# Patient Record
Sex: FEMALE | Race: BLACK OR AFRICAN AMERICAN | NOT HISPANIC OR LATINO | URBAN - METROPOLITAN AREA
[De-identification: names, ages, dates, MRNs, and addresses within clinical notes are randomized per-mention and may not be internally consistent; named-entity substitution may affect disease eponyms.]

---

## 2020-07-29 ENCOUNTER — INPATIENT (INPATIENT)
Facility: HOSPITAL | Age: 75
LOS: 1 days | Discharge: HOME | DRG: 683 | End: 2020-07-31
Attending: INTERNAL MEDICINE | Admitting: INTERNAL MEDICINE
Payer: SELF-PAY

## 2020-07-29 VITALS
OXYGEN SATURATION: 98 % | TEMPERATURE: 100 F | WEIGHT: 139.99 LBS | DIASTOLIC BLOOD PRESSURE: 93 MMHG | HEART RATE: 101 BPM | SYSTOLIC BLOOD PRESSURE: 165 MMHG | RESPIRATION RATE: 16 BRPM

## 2020-07-29 DIAGNOSIS — E87.0 HYPEROSMOLALITY AND HYPERNATREMIA: ICD-10-CM

## 2020-07-29 DIAGNOSIS — F32.9 MAJOR DEPRESSIVE DISORDER, SINGLE EPISODE, UNSPECIFIED: ICD-10-CM

## 2020-07-29 DIAGNOSIS — I10 ESSENTIAL (PRIMARY) HYPERTENSION: ICD-10-CM
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
## 2020-07-29 DIAGNOSIS — R79.89 OTHER SPECIFIED ABNORMAL FINDINGS OF BLOOD CHEMISTRY: ICD-10-CM

## 2020-07-29 DIAGNOSIS — Z00.00 ENCOUNTER FOR GENERAL ADULT MEDICAL EXAMINATION WITHOUT ABNORMAL FINDINGS: ICD-10-CM

## 2020-07-29 DIAGNOSIS — M62.82 RHABDOMYOLYSIS: ICD-10-CM

## 2020-07-29 LAB
ALBUMIN SERPL ELPH-MCNC: 3.9 G/DL — SIGNIFICANT CHANGE UP (ref 3.4–5)
ALP SERPL-CCNC: 82 U/L — SIGNIFICANT CHANGE UP (ref 40–120)
ALT FLD-CCNC: 27 U/L — SIGNIFICANT CHANGE UP (ref 12–42)
ANION GAP SERPL CALC-SCNC: 16 MMOL/L — SIGNIFICANT CHANGE UP (ref 5–17)
ANION GAP SERPL CALC-SCNC: 17 MMOL/L — HIGH (ref 9–16)
APPEARANCE UR: CLEAR — SIGNIFICANT CHANGE UP
AST SERPL-CCNC: 43 U/L — HIGH (ref 15–37)
BASOPHILS # BLD AUTO: 0.04 K/UL — SIGNIFICANT CHANGE UP (ref 0–0.2)
BASOPHILS NFR BLD AUTO: 0.4 % — SIGNIFICANT CHANGE UP (ref 0–2)
BILIRUB SERPL-MCNC: 0.6 MG/DL — SIGNIFICANT CHANGE UP (ref 0.2–1.2)
BILIRUB UR-MCNC: ABNORMAL
BUN SERPL-MCNC: 20 MG/DL — SIGNIFICANT CHANGE UP (ref 7–23)
BUN SERPL-MCNC: 31 MG/DL — HIGH (ref 7–23)
CALCIUM SERPL-MCNC: 10.1 MG/DL — SIGNIFICANT CHANGE UP (ref 8.5–10.5)
CALCIUM SERPL-MCNC: 9.1 MG/DL — SIGNIFICANT CHANGE UP (ref 8.4–10.5)
CHLORIDE SERPL-SCNC: 109 MMOL/L — HIGH (ref 96–108)
CHLORIDE SERPL-SCNC: 109 MMOL/L — HIGH (ref 96–108)
CK SERPL-CCNC: 471 U/L — HIGH (ref 25–170)
CK SERPL-CCNC: 594 U/L — HIGH (ref 26–192)
CO2 SERPL-SCNC: 21 MMOL/L — LOW (ref 22–31)
CO2 SERPL-SCNC: 22 MMOL/L — SIGNIFICANT CHANGE UP (ref 22–31)
COLOR SPEC: YELLOW — SIGNIFICANT CHANGE UP
CREAT SERPL-MCNC: 0.78 MG/DL — SIGNIFICANT CHANGE UP (ref 0.5–1.3)
CREAT SERPL-MCNC: 1.22 MG/DL — SIGNIFICANT CHANGE UP (ref 0.5–1.3)
DIFF PNL FLD: ABNORMAL
EOSINOPHIL # BLD AUTO: 0 K/UL — SIGNIFICANT CHANGE UP (ref 0–0.5)
EOSINOPHIL NFR BLD AUTO: 0 % — SIGNIFICANT CHANGE UP (ref 0–6)
GLUCOSE SERPL-MCNC: 84 MG/DL — SIGNIFICANT CHANGE UP (ref 70–99)
GLUCOSE SERPL-MCNC: 94 MG/DL — SIGNIFICANT CHANGE UP (ref 70–99)
GLUCOSE UR QL: NEGATIVE — SIGNIFICANT CHANGE UP
HCT VFR BLD CALC: 40.3 % — SIGNIFICANT CHANGE UP (ref 34.5–45)
HGB BLD-MCNC: 12.7 G/DL — SIGNIFICANT CHANGE UP (ref 11.5–15.5)
IMM GRANULOCYTES NFR BLD AUTO: 0.4 % — SIGNIFICANT CHANGE UP (ref 0–1.5)
KETONES UR-MCNC: 15 MG/DL
LACTATE SERPL-SCNC: 1.6 MMOL/L — SIGNIFICANT CHANGE UP (ref 0.4–2)
LEUKOCYTE ESTERASE UR-ACNC: ABNORMAL
LYMPHOCYTES # BLD AUTO: 1.07 K/UL — SIGNIFICANT CHANGE UP (ref 1–3.3)
LYMPHOCYTES # BLD AUTO: 10.6 % — LOW (ref 13–44)
MAGNESIUM SERPL-MCNC: 2.4 MG/DL — SIGNIFICANT CHANGE UP (ref 1.6–2.6)
MCHC RBC-ENTMCNC: 27.3 PG — SIGNIFICANT CHANGE UP (ref 27–34)
MCHC RBC-ENTMCNC: 31.5 GM/DL — LOW (ref 32–36)
MCV RBC AUTO: 86.5 FL — SIGNIFICANT CHANGE UP (ref 80–100)
MONOCYTES # BLD AUTO: 0.76 K/UL — SIGNIFICANT CHANGE UP (ref 0–0.9)
MONOCYTES NFR BLD AUTO: 7.6 % — SIGNIFICANT CHANGE UP (ref 2–14)
NEUTROPHILS # BLD AUTO: 8.15 K/UL — HIGH (ref 1.8–7.4)
NEUTROPHILS NFR BLD AUTO: 81 % — HIGH (ref 43–77)
NITRITE UR-MCNC: NEGATIVE — SIGNIFICANT CHANGE UP
NRBC # BLD: 0 /100 WBCS — SIGNIFICANT CHANGE UP (ref 0–0)
PH UR: 5.5 — SIGNIFICANT CHANGE UP (ref 5–8)
PLATELET # BLD AUTO: 201 K/UL — SIGNIFICANT CHANGE UP (ref 150–400)
POTASSIUM SERPL-MCNC: 3.5 MMOL/L — SIGNIFICANT CHANGE UP (ref 3.5–5.3)
POTASSIUM SERPL-MCNC: 3.5 MMOL/L — SIGNIFICANT CHANGE UP (ref 3.5–5.3)
POTASSIUM SERPL-SCNC: 3.5 MMOL/L — SIGNIFICANT CHANGE UP (ref 3.5–5.3)
POTASSIUM SERPL-SCNC: 3.5 MMOL/L — SIGNIFICANT CHANGE UP (ref 3.5–5.3)
PROT SERPL-MCNC: 8.2 G/DL — SIGNIFICANT CHANGE UP (ref 6.4–8.2)
PROT UR-MCNC: ABNORMAL MG/DL
RBC # BLD: 4.66 M/UL — SIGNIFICANT CHANGE UP (ref 3.8–5.2)
RBC # FLD: 13.3 % — SIGNIFICANT CHANGE UP (ref 10.3–14.5)
SODIUM SERPL-SCNC: 146 MMOL/L — HIGH (ref 135–145)
SODIUM SERPL-SCNC: 148 MMOL/L — HIGH (ref 132–145)
SP GR SPEC: >=1.03 — SIGNIFICANT CHANGE UP (ref 1–1.03)
TROPONIN I SERPL-MCNC: 0.23 NG/ML — HIGH (ref 0.02–0.06)
TROPONIN I SERPL-MCNC: 0.26 NG/ML — HIGH (ref 0.02–0.06)
TROPONIN T SERPL-MCNC: 0.02 NG/ML — HIGH (ref 0–0.01)
UROBILINOGEN FLD QL: 0.2 E.U./DL — SIGNIFICANT CHANGE UP
WBC # BLD: 10.06 K/UL — SIGNIFICANT CHANGE UP (ref 3.8–10.5)
WBC # FLD AUTO: 10.06 K/UL — SIGNIFICANT CHANGE UP (ref 3.8–10.5)

## 2020-07-29 PROCEDURE — 72192 CT PELVIS W/O DYE: CPT | Mod: 26

## 2020-07-29 PROCEDURE — 71045 X-RAY EXAM CHEST 1 VIEW: CPT | Mod: 26

## 2020-07-29 PROCEDURE — 93010 ELECTROCARDIOGRAM REPORT: CPT

## 2020-07-29 PROCEDURE — 99222 1ST HOSP IP/OBS MODERATE 55: CPT | Mod: AI,GC

## 2020-07-29 PROCEDURE — 70450 CT HEAD/BRAIN W/O DYE: CPT | Mod: 26

## 2020-07-29 PROCEDURE — 99285 EMERGENCY DEPT VISIT HI MDM: CPT

## 2020-07-29 RX ORDER — SENNA PLUS 8.6 MG/1
2 TABLET ORAL AT BEDTIME
Refills: 0 | Status: DISCONTINUED | OUTPATIENT
Start: 2020-07-29 | End: 2020-07-31

## 2020-07-29 RX ORDER — HYDRALAZINE HCL 50 MG
25 TABLET ORAL ONCE
Refills: 0 | Status: COMPLETED | OUTPATIENT
Start: 2020-07-29 | End: 2020-07-29

## 2020-07-29 RX ORDER — SODIUM CHLORIDE 9 MG/ML
1000 INJECTION INTRAMUSCULAR; INTRAVENOUS; SUBCUTANEOUS ONCE
Refills: 0 | Status: COMPLETED | OUTPATIENT
Start: 2020-07-29 | End: 2020-07-29

## 2020-07-29 RX ORDER — SODIUM CHLORIDE 9 MG/ML
1000 INJECTION, SOLUTION INTRAVENOUS
Refills: 0 | Status: DISCONTINUED | OUTPATIENT
Start: 2020-07-29 | End: 2020-07-30

## 2020-07-29 RX ORDER — ENOXAPARIN SODIUM 100 MG/ML
40 INJECTION SUBCUTANEOUS EVERY 24 HOURS
Refills: 0 | Status: DISCONTINUED | OUTPATIENT
Start: 2020-07-29 | End: 2020-07-31

## 2020-07-29 RX ORDER — ACETAMINOPHEN 500 MG
650 TABLET ORAL EVERY 6 HOURS
Refills: 0 | Status: DISCONTINUED | OUTPATIENT
Start: 2020-07-29 | End: 2020-07-31

## 2020-07-29 RX ORDER — PANTOPRAZOLE SODIUM 20 MG/1
40 TABLET, DELAYED RELEASE ORAL
Refills: 0 | Status: DISCONTINUED | OUTPATIENT
Start: 2020-07-29 | End: 2020-07-30

## 2020-07-29 RX ADMIN — Medication 25 MILLIGRAM(S): at 22:13

## 2020-07-29 RX ADMIN — SODIUM CHLORIDE 1000 MILLILITER(S): 9 INJECTION INTRAMUSCULAR; INTRAVENOUS; SUBCUTANEOUS at 15:31

## 2020-07-29 RX ADMIN — Medication 650 MILLIGRAM(S): at 22:35

## 2020-07-29 RX ADMIN — SODIUM CHLORIDE 1000 MILLILITER(S): 9 INJECTION INTRAMUSCULAR; INTRAVENOUS; SUBCUTANEOUS at 15:00

## 2020-07-29 RX ADMIN — Medication 650 MILLIGRAM(S): at 22:13

## 2020-07-29 RX ADMIN — SODIUM CHLORIDE 2000 MILLILITER(S): 9 INJECTION INTRAMUSCULAR; INTRAVENOUS; SUBCUTANEOUS at 14:44

## 2020-07-29 NOTE — ED ADULT NURSE REASSESSMENT NOTE - NS ED NURSE REASSESS COMMENT FT1
Pt lab results rvwd.  Elevated troponin and CK noted.  Pt in NSR, denies CP.  Fluids in progress.  Will continue to reassess and reevaluate.

## 2020-07-29 NOTE — H&P ADULT - PROBLEM SELECTOR PLAN 5
- BUN/Cr 31/1.22 likely 2/2 decreased PO intake  - will c/w fluids    # Anion Gap  - AG 17 likely ketosis based on UA  - will feed and give some D5 tonight
Patient/Caregiver provided printed discharge information.

## 2020-07-29 NOTE — ED PROVIDER NOTE - CLINICAL SUMMARY MEDICAL DECISION MAKING FREE TEXT BOX
Patient with HTN, depression, frequent falls, BIB EMS after slip and fall from urinal + urinary incontinence, with R hip pain. XR chest WNL. CT head and pelvis negative for acute injury. found to have elevated trop .2 with 's nl Cr, Na 146. to be admitted for IV hydration and continued monitoring

## 2020-07-29 NOTE — ED PROVIDER NOTE - ATTENDING CONTRIBUTION TO CARE
PMHx HTN, depression, ? bipolar, unsteady gait walks with cane presents with fall from bed x 20 hrs ago. states that she had gotten out of bed at 4:40p yesterday and was on a urinal when she slipped falling back on her bed.  tachy otherwirse normal  vitals tender pelvis unable to ambulate//labs ua creatitine trop admit for nstemi

## 2020-07-29 NOTE — ED ADULT NURSE NOTE - NSIMPLEMENTINTERV_GEN_ALL_ED
Implemented All Fall Risk Interventions:  [place] to call system. Call bell, personal items and telephone within reach. Instruct patient to call for assistance. Room bathroom lighting operational. Non-slip footwear when patient is off stretcher. Physically safe environment: no spills, clutter or unnecessary equipment. Stretcher in lowest position, wheels locked, appropriate side rails in place. Provide visual cue, wrist band, yellow gown, etc. Monitor gait and stability. Monitor for mental status changes and reorient to person, place, and time. Review medications for side effects contributing to fall risk. Reinforce activity limits and safety measures with patient and family.

## 2020-07-29 NOTE — H&P ADULT - PROBLEM SELECTOR PLAN 6
- takes meds at home, doesn't know what  - SBP in 170's on arrival  - will give one dose of hydral 25 to bridge until morning  - med rec in AM

## 2020-07-29 NOTE — H&P ADULT - HISTORY OF PRESENT ILLNESS
75yoF with a PMH of HTN and depression presents s/p fall at home. Pt states that she keeps hanna jars near her to bed to urinate at night so that she doesn't have to walk to the bathroom. While trying to use one of these hanna jars last night she fell off her bed and urinated on the floor. She denies LOC however this was an unwitnessed event. She denies head trauma. ROS negative for fever, chills, chest pain, SOB, n/v/c/d, abd pain, dysuria, urinary frequency, numbness, or tingling. She reports no problems with ADL's. Can't remember the names of her medications but says she uses [facility].     PMH: as above  meds: she can't remember, but takes BP meds. By her description takes a diuretic  PSH: none  Allergies: NKDA  SH: denies smoking or drinking. Lives alone on [address] [place] [address]    ED  VS: T 99.7, /93, , RR 16 sat 98% on room air  Labs: WBC 10, hgb 12.7, , Na 148, AG 17, BUN/Cr 31/1.22. Troponin I 0.26 --> 0.22 s/p 2L. UA dirty  Studies: EKG without acute ischemia  Imaging: CXR clear  Intervention: 2L NS

## 2020-07-29 NOTE — H&P ADULT - PROBLEM SELECTOR PLAN 2
- elevated troponin in setting of missed BP meds  - no chest pain or cardiac complaints  - EKG without acute ischemia  - unlikely a coronary event  - will trend troponin

## 2020-07-29 NOTE — ED PROVIDER NOTE - PROGRESS NOTE DETAILS
spoke with cardiology on call. Dr Anne. believes that trop elevation is due to rhabdomyelosis rather than NSTEMI and does not need to be admitted to a monitored setting.

## 2020-07-29 NOTE — ED PROVIDER NOTE - OBJECTIVE STATEMENT
PMHx HTN presents with fall from bed x 20 hrs ago. states that she had gotten out of bed at 4:40p yesturday and was urin PMHx HTN, depression, unsteady gait walks with cane presents with fall from bed x 20 hrs ago. states that she had gotten out of bed at 4:40p yesterday and was on a urinal when she slipped falling back on her bed. denies LOC, h/a, dizziness, nausea, vomiting, fever, chills, URI sx, recent travel. states she attempted to get up by sliding herself down to the floor but was unable to get off the floor. she was found by a neighbor who was doing a wellness check. + incontinent of urine. admits to R hip pain from another fall 2 weeks ago. states that she had injections to that joint.

## 2020-07-29 NOTE — H&P ADULT - PROBLEM SELECTOR PLAN 1
- pt slipped off her bed while trying to urinate in a hanna jar at home, based on history sounds like mechanical fall.   - unclear how much time she spent on floor because being found  - Pt denies LOC, vagal prodrome, or head trauma  - CT head and pelvis both negative  - EKG did not reveal any conduction disease  - will order echo to rule out structural disease  - PT consult in AM - pt slipped off her bed while trying to urinate in a hanna jar at home, based on history sounds like mechanical fall.   - unclear how much time she spent on floor because being found  - Pt denies LOC, vagal prodrome, or head trauma  - CT head and pelvis both negative  - EKG did not reveal any conduction disease  - PT consult in AM

## 2020-07-29 NOTE — H&P ADULT - PROBLEM SELECTOR PLAN 8
.  F: 1L D5 1/2NS  E: PRN  N: DASH  DVT: lovenox  GI: PPI  bowel: senna  glucose: none  pain: tylenol  dispo:  RMF .  F: 1L D5 1/2NS  E: PRN  N: DASH  DVT: lovenox  GI: none  bowel: senna  glucose: none  pain: tylenol  dispo: RMF

## 2020-07-29 NOTE — H&P ADULT - PROBLEM SELECTOR PLAN 4
- Na 148 on admission likely 2/2 decreased PO intake, down to 146 s/p 2L  - will c/w IVF and trend chemistry

## 2020-07-29 NOTE — ED ADULT NURSE NOTE - OBJECTIVE STATEMENT
Pt w/ PMH of traumatic fall w/ injury 20+ years ago presents today BIBA c/o mechanical fall last night at 17:00.  Pt was on the floor until she was found by a friend at home today around noon.  Pt is A&Ox3.  Pt denies LOC, head trauma, CP, numbness, paresthesias or headache.  Pt is pending imagining and lab results.  Will continue to reassess and reevaluate.

## 2020-07-29 NOTE — H&P ADULT - ATTENDING COMMENTS
75F PMHx HTN, Depression, and unsteady gait (ambulates with cane) – presenting s/p mechanical fall and period of immobility due to inability to get up. Admission labs illustrate dehydration and trop elevation in setting of HTN urgency.    At bedside, patient is very pleasant, quirky. Offering no complaints whatsoever and relays a history of a clear mechanical fall w/o c/f prodrome or syncopal episodes. Exam is within normal limits aside from mildly dry mucus membranes. A&O x 2. Demonstrating good strength in b/l UE and LE. Sensation intact.     Presentation consistent with dehydration (Hypernatremia, High SG on UA) and hypertensive urgency. Suspect mild APRIL with subsequent improvement with fluids is the result of pre-renal injury. Troponin leak likely related to demand in the setting of uncontrolled HTN - patient w/o c/o chest pain/exertional dyspnea and no acute ischemic changes on EKG. ED consulted Cardiology - suspected that troponin was related to rhabdomyolysis - low concern for this given minimally elevated CPK and UA demonstrated blood and RBCs - unlikely significant myoglobinuria.     - Med Rec required - patient can not recall her home medications. Believes she is on a diuretic, which may not be the best agent in an elderly female, but, electrolytes are within normal limits on admission. May benefit from Norvasc addition if home regimen is inadequate.  - PT

## 2020-07-29 NOTE — ED ADULT NURSE NOTE - CHPI ED NUR SYMPTOMS NEG
no bleeding/no deformity/no abrasion/no confusion/no vomiting/no numbness/no fever/no loss of consciousness/no tingling

## 2020-07-29 NOTE — ED ADULT TRIAGE NOTE - CHIEF COMPLAINT QUOTE
BIBA after neighbor called 911 due to patient sustaining mechanical fall 1.5 days ago. pt reports having right groin pain when getting (from old injury). as per EMS, pt was unable to ambulate or bear weight on scene. h/o HTN and depression. denies head injury/LOC.

## 2020-07-29 NOTE — H&P ADULT - NSHPLABSRESULTS_GEN_ALL_CORE
12.7   10.06 )-----------( 201      ( 29 Jul 2020 14:32 )             40.3       07-29    146<H>  |  109<H>  |  20  ----------------------------<  84  3.5   |  21<L>  |  0.78    Ca    9.1      29 Jul 2020 22:43  Mg     2.4     07-29    TPro  8.2  /  Alb  3.9  /  TBili  0.6  /  DBili  x   /  AST  43<H>  /  ALT  27  /  AlkPhos  82  07-29              Urinalysis Basic - ( 29 Jul 2020 16:03 )    Color: Yellow / Appearance: Clear / SG: >=1.030 / pH: x  Gluc: x / Ketone: 15 mg/dL  / Bili: Small / Urobili: 0.2 E.U./dL   Blood: x / Protein: Trace mg/dL / Nitrite: NEGATIVE   Leuk Esterase: Small / RBC: 5-10 /HPF / WBC 5-10 /HPF   Sq Epi: x / Non Sq Epi: Moderate /HPF / Bacteria: Present /HPF            Lactate Trend  07-29 @ 14:32 Lactate:1.6       CARDIAC MARKERS ( 29 Jul 2020 22:43 )  x     / 0.02 ng/mL / 471 U/L / x     / x      CARDIAC MARKERS ( 29 Jul 2020 18:34 )  0.227 ng/mL / x     / x     / x     / x      CARDIAC MARKERS ( 29 Jul 2020 14:32 )  0.263 ng/mL / x     / 594 U/L / x     / x            CAPILLARY BLOOD GLUCOSE

## 2020-07-30 LAB
ANION GAP SERPL CALC-SCNC: 14 MMOL/L — SIGNIFICANT CHANGE UP (ref 5–17)
APPEARANCE UR: CLEAR — SIGNIFICANT CHANGE UP
BACTERIA # UR AUTO: PRESENT /HPF
BILIRUB UR-MCNC: ABNORMAL
BUN SERPL-MCNC: 20 MG/DL — SIGNIFICANT CHANGE UP (ref 7–23)
CALCIUM SERPL-MCNC: 9 MG/DL — SIGNIFICANT CHANGE UP (ref 8.4–10.5)
CHLORIDE SERPL-SCNC: 107 MMOL/L — SIGNIFICANT CHANGE UP (ref 96–108)
CK SERPL-CCNC: 400 U/L — HIGH (ref 25–170)
CO2 SERPL-SCNC: 23 MMOL/L — SIGNIFICANT CHANGE UP (ref 22–31)
COLOR SPEC: YELLOW — SIGNIFICANT CHANGE UP
CREAT SERPL-MCNC: 0.76 MG/DL — SIGNIFICANT CHANGE UP (ref 0.5–1.3)
DIFF PNL FLD: ABNORMAL
EPI CELLS # UR: SIGNIFICANT CHANGE UP /HPF (ref 0–5)
GLUCOSE SERPL-MCNC: 91 MG/DL — SIGNIFICANT CHANGE UP (ref 70–99)
GLUCOSE UR QL: NEGATIVE — SIGNIFICANT CHANGE UP
HCT VFR BLD CALC: 35.6 % — SIGNIFICANT CHANGE UP (ref 34.5–45)
HGB BLD-MCNC: 10.8 G/DL — LOW (ref 11.5–15.5)
KETONES UR-MCNC: 40 MG/DL
LEUKOCYTE ESTERASE UR-ACNC: NEGATIVE — SIGNIFICANT CHANGE UP
MAGNESIUM SERPL-MCNC: 2 MG/DL — SIGNIFICANT CHANGE UP (ref 1.6–2.6)
MCHC RBC-ENTMCNC: 26.8 PG — LOW (ref 27–34)
MCHC RBC-ENTMCNC: 30.3 GM/DL — LOW (ref 32–36)
MCV RBC AUTO: 88.3 FL — SIGNIFICANT CHANGE UP (ref 80–100)
NITRITE UR-MCNC: NEGATIVE — SIGNIFICANT CHANGE UP
NRBC # BLD: 0 /100 WBCS — SIGNIFICANT CHANGE UP (ref 0–0)
PH UR: 6 — SIGNIFICANT CHANGE UP (ref 5–8)
PHOSPHATE SERPL-MCNC: 2.7 MG/DL — SIGNIFICANT CHANGE UP (ref 2.5–4.5)
PLATELET # BLD AUTO: 173 K/UL — SIGNIFICANT CHANGE UP (ref 150–400)
POTASSIUM SERPL-MCNC: 3.2 MMOL/L — LOW (ref 3.5–5.3)
POTASSIUM SERPL-SCNC: 3.2 MMOL/L — LOW (ref 3.5–5.3)
PROT UR-MCNC: ABNORMAL MG/DL
RBC # BLD: 4.03 M/UL — SIGNIFICANT CHANGE UP (ref 3.8–5.2)
RBC # FLD: 13.4 % — SIGNIFICANT CHANGE UP (ref 10.3–14.5)
RBC CASTS # UR COMP ASSIST: < 5 /HPF — SIGNIFICANT CHANGE UP
SARS-COV-2 RNA SPEC QL NAA+PROBE: SIGNIFICANT CHANGE UP
SODIUM SERPL-SCNC: 144 MMOL/L — SIGNIFICANT CHANGE UP (ref 135–145)
SP GR SPEC: >=1.03 — SIGNIFICANT CHANGE UP (ref 1–1.03)
TROPONIN T SERPL-MCNC: 0.02 NG/ML — HIGH (ref 0–0.01)
UROBILINOGEN FLD QL: 0.2 E.U./DL — SIGNIFICANT CHANGE UP
WBC # BLD: 8.04 K/UL — SIGNIFICANT CHANGE UP (ref 3.8–10.5)
WBC # FLD AUTO: 8.04 K/UL — SIGNIFICANT CHANGE UP (ref 3.8–10.5)
WBC UR QL: < 5 /HPF — SIGNIFICANT CHANGE UP

## 2020-07-30 PROCEDURE — 99233 SBSQ HOSP IP/OBS HIGH 50: CPT | Mod: GC

## 2020-07-30 RX ORDER — SERTRALINE 25 MG/1
1 TABLET, FILM COATED ORAL
Qty: 0 | Refills: 0 | DISCHARGE

## 2020-07-30 RX ORDER — GABAPENTIN 400 MG/1
300 CAPSULE ORAL AT BEDTIME
Refills: 0 | Status: DISCONTINUED | OUTPATIENT
Start: 2020-07-30 | End: 2020-07-31

## 2020-07-30 RX ORDER — GABAPENTIN 400 MG/1
1 CAPSULE ORAL
Qty: 0 | Refills: 0 | DISCHARGE

## 2020-07-30 RX ORDER — SERTRALINE 25 MG/1
50 TABLET, FILM COATED ORAL DAILY
Refills: 0 | Status: DISCONTINUED | OUTPATIENT
Start: 2020-07-30 | End: 2020-07-31

## 2020-07-30 RX ADMIN — Medication 650 MILLIGRAM(S): at 04:47

## 2020-07-30 RX ADMIN — SODIUM CHLORIDE 50 MILLILITER(S): 9 INJECTION, SOLUTION INTRAVENOUS at 00:26

## 2020-07-30 RX ADMIN — Medication 650 MILLIGRAM(S): at 07:35

## 2020-07-30 RX ADMIN — GABAPENTIN 300 MILLIGRAM(S): 400 CAPSULE ORAL at 21:02

## 2020-07-30 RX ADMIN — SERTRALINE 50 MILLIGRAM(S): 25 TABLET, FILM COATED ORAL at 13:01

## 2020-07-30 RX ADMIN — ENOXAPARIN SODIUM 40 MILLIGRAM(S): 100 INJECTION SUBCUTANEOUS at 04:47

## 2020-07-30 RX ADMIN — Medication 0.1 MILLIGRAM(S): at 21:02

## 2020-07-30 NOTE — DISCHARGE NOTE PROVIDER - NSDCFUADDAPPT_GEN_ALL_CORE_FT
[facility]  [address]  Phone: [phone]  Fax: (   )    -  Established Patient  Follow Up Time: 1 week

## 2020-07-30 NOTE — DISCHARGE NOTE PROVIDER - NSDCMRMEDTOKEN_GEN_ALL_CORE_FT
cloNIDine 0.1 mg oral tablet: 1 tab(s) orally 2 times a day  gabapentin 300 mg oral capsule: 1 cap(s) orally once a day (at bedtime)  sertraline 50 mg oral tablet: 1 tab(s) orally once a day

## 2020-07-30 NOTE — PROGRESS NOTE ADULT - PROBLEM SELECTOR PLAN 5
- BUN/Cr 31/1.22 likely 2/2 decreased PO intake  - will c/w fluids    # Anion Gap  - AG 17 likely ketosis based on UA  - will feed and give some D5 tonight - BUN/Cr 31/1.22 likely 2/2 decreased PO intake  - resolved to 20/0.76    # Anion Gap  - AG 17 likely ketosis based on UA  - will feed and give some D5 tonight

## 2020-07-30 NOTE — DISCHARGE NOTE PROVIDER - HOSPITAL COURSE
***INCOMPLETE*****        Patient is 74 yo F with a PMH of HTN and depression presents s/p fall at home. Admitted for mechanical fall evaluation, found to have APRIL and hypernatremia, stable for discharge home with PT.        Problem List/Main Diagnoses:         1. Mechanical Fall: Evaluated by PT after all other causes ruled out.        2. APRIL: resolved with IVF        3. Hypernatremia: resolved with IVF            Inpatient treatment course: IVF and PT evaluation         New medications: none         Labs to be followed outpatient: none        Exam to be followed outpatient: none ***INCOMPLETE*****    Patient is 74 yo F with a PMH of HTN and depression presents s/p fall at home. Admitted for mechanical fall evaluation, found to have APRIL and hypernatremia. Resolved, and patient stable for discharge to [facility], but refused.  Stable for discharge home with PT.        Problem List/Main Diagnoses:         1. Mechanical Fall: Evaluated by PT after all other causes ruled out. [facility] recommended but patient refused. Discharged home with         2. APRIL: resolved with IVF        3. Hypernatremia: resolved with IVF            Inpatient treatment course: IVF and PT evaluation         New medications: none         Labs to be followed outpatient: none        Exam to be followed outpatient: none ***INCOMPLETE*****    Patient is 76 yo F with a PMH of HTN and depression presents s/p fall at home. Admitted for mechanical fall evaluation, found to have APRIL and hypernatremia. Resolved, and patient stable for discharge to [facility], but refused.  Patient needs assistance, but stable for discharge home with in home PT.        Problem List/Main Diagnoses:         1. Mechanical Fall: Evaluated by PT after all other causes ruled out. [facility] recommended but patient refused. Discharged home with in home PT        2. APRIL: resolved with IVF        3. Hypernatremia: resolved with IVF            Inpatient treatment course: IVF and PT evaluation         New medications: none         Labs to be followed outpatient: none        Exam to be followed outpatient: none Patient is 76 yo F with a PMH of HTN and depression presents s/p fall at home. Admitted for mechanical fall evaluation, found to have APRIL and hypernatremia. Resolved, and patient stable for discharge to [facility], but refused.  Patient needs assistance, but stable for discharge home with in home PT.        Problem List/Main Diagnoses:         1. Mechanical Fall: Evaluated by PT after all other causes ruled out. [facility] recommended but patient refused. Discharged home with in home PT        2. APRIL: resolved with IVF        3. Hypernatremia: resolved with IVF            Inpatient treatment course: IVF and PT evaluation         New medications: none         Labs to be followed outpatient: none        Exam to be followed outpatient: none Patient is 76 yo F with a PMH of HTN and depression presents s/p fall at home. Admitted for mechanical fall evaluation, found to have APRIL and hypernatremia. Resolved, and patient stable for discharge home with PT.        Problem List/Main Diagnoses:         1. Mechanical Fall: Evaluated by PT after all other causes ruled out. Discharged home with in home PT        2. APRIL: resolved with IVF        3. Hypernatremia: resolved with IVF            Inpatient treatment course: IVF, home medications continued, and PT evaluation         New medications: none         Labs to be followed outpatient: none        Exam to be followed outpatient: none Patient is 74 yo F with a PMH of HTN and depression presents s/p fall at home. Admitted for mechanical fall evaluation, found to have APRIL and hypernatremia. Resolved, and patient stable for discharge home with PT.        Problem List/Main Diagnoses:         1. Mechanical Fall: Evaluated by PT after all other causes ruled out. Discharged home with in home PT        2. APRIL: resolved with IVF        3. Hypernatremia: resolved with IVF            Inpatient treatment course: IVF          New medications: none         Labs to be followed outpatient: none        Exam to be followed outpatient: none

## 2020-07-30 NOTE — PHYSICAL THERAPY INITIAL EVALUATION ADULT - CRITERIA FOR SKILLED THERAPEUTIC INTERVENTIONS
functional limitations in following categories/anticipated discharge recommendation/risk reduction/prevention/therapy frequency/impairments found/rehab potential

## 2020-07-30 NOTE — PROGRESS NOTE ADULT - PROBLEM SELECTOR PLAN 6
- takes meds at home, doesn't know what  - SBP in 170's on arrival  - will give one dose of hydral 25 to bridge until morning  - med rec in AM - restarted home clonidine 0.1 mg  - SBP in 170's on arrival, given one dose hydral 25

## 2020-07-30 NOTE — DISCHARGE NOTE PROVIDER - NSDCCPCAREPLAN_GEN_ALL_CORE_FT
PRINCIPAL DISCHARGE DIAGNOSIS  Diagnosis: Fall  Assessment and Plan of Treatment: You were admitted to the hospital for evaluation after falling at home      SECONDARY DISCHARGE DIAGNOSES  Diagnosis: HTN (hypertension)  Assessment and Plan of Treatment: HTN (hypertension)    Diagnosis: Depression  Assessment and Plan of Treatment: Depression PRINCIPAL DISCHARGE DIAGNOSIS  Diagnosis: Fall  Assessment and Plan of Treatment: You were admitted to the hospital for evaluation after falling at home. You were evaluated by physical therapists in the hospital and they determined that you would benefit from continued Physical Therapy      SECONDARY DISCHARGE DIAGNOSES  Diagnosis: HTN (hypertension)  Assessment and Plan of Treatment: HTN (hypertension)    Diagnosis: Depression  Assessment and Plan of Treatment: Depression PRINCIPAL DISCHARGE DIAGNOSIS  Diagnosis: Fall  Assessment and Plan of Treatment: You were admitted to the hospital for monitoring after falling at home. During your hospitalization you were evaluated by physical therapists in the hospital and they determined that you would benefit from continued Physical Therapy in the setting of Sub-Acute Rehab. You refused this option and therfore, Physical Therapy in your home was recommended. There is increased risk of additional falls when you are unsupervised at home. Please contact EMS immediately if you fall at home.  Continue to work with Physical Therapy in your home to regain strength.      SECONDARY DISCHARGE DIAGNOSES  Diagnosis: HTN (hypertension)  Assessment and Plan of Treatment: During your hospitalization you were given a fast acting blood pressure medication in the ED to correct your high blood pressure, then your home blood pressure medication was restarted and your blood pressures normalized.  Please continue to take your blood pressure medication as prescribed.  If you feel lightheaded, have a pounding headache, feel dizzy or nauseated, or have an episode of blurry vision please go to the nearest ED for treatment.    Diagnosis: Depression  Assessment and Plan of Treatment: During your hospitalization you were continued on your home medications for depression.  Please continue to follow up with your primary care physician or therapist to manage your depression.  If you have feelings of hopelessness, intrusive negative thoughts, or thoughts about suicide, please go to the nearest ED for treatment. PRINCIPAL DISCHARGE DIAGNOSIS  Diagnosis: Fall  Assessment and Plan of Treatment: You were admitted to the hospital for monitoring after falling at home. During your hospitalization you were evaluated by physical therapists in the hospital and they determined that you would benefit from continued Physical Therapy. There is increased risk of additional falls when you are unsupervised at home. Please contact EMS immediately if you fall at home.  Continue to work with Physical Therapy in your home to regain strength.      SECONDARY DISCHARGE DIAGNOSES  Diagnosis: HTN (hypertension)  Assessment and Plan of Treatment: During your hospitalization you were given a fast acting blood pressure medication in the ED to correct your high blood pressure, then your home blood pressure medication was restarted and your blood pressures normalized.  Please continue to take your blood pressure medication as prescribed.  If you feel lightheaded, have a pounding headache, feel dizzy or nauseated, or have an episode of blurry vision please go to the nearest ED for treatment.    Diagnosis: Depression  Assessment and Plan of Treatment: During your hospitalization you were continued on your home medications for depression.  Please continue to follow up with your primary care physician or therapist to manage your depression.  If you have feelings of hopelessness, intrusive negative thoughts, or thoughts about suicide, please go to the nearest ED for treatment.

## 2020-07-30 NOTE — PROGRESS NOTE ADULT - SUBJECTIVE AND OBJECTIVE BOX
***INCOMPLETE***  INTERVAL HPI/OVERNIGHT EVENTS:      On further ROS, patient did not have complaint of: Headache, Blurred Vision, Cough, Dyspnea, Palpitations, Abdominal Pain, N/V, Weakness, Change in Sensation.     VITAL SIGNS:  T(F): 98.5 (07-30-20 @ 04:58)  HR: 71 (07-30-20 @ 04:58)  BP: 159/78 (07-30-20 @ 04:58)  RR: 20 (07-30-20 @ 04:58)  SpO2: 97% (07-30-20 @ 04:58)  Wt(kg): --    Input & Output:    07-29-20 @ 07:01  -  07-30-20 @ 07:00  --------------------------------------------------------  IN: 0 mL / OUT: 400 mL / NET: -400 mL        PHYSICAL EXAM:  Constitutional: Patient seated comfortably in bed, of appropriate color, nutrition, and hydration.   HEENT: PERRLA, Normal Range of eye movement with no complaint of diplopia, Normal Hearing  Neck: No LAD, No JVD  Back: Normal spine flexure, No CVA tenderness  Respiratory: Normal air entry, Lungs clear to auscultation b/l w/o wheeze or crepitations.   Cardiovascular: S1 and S2 present - no additional abnormal sounds or murmurs. Normal rhythm and rate of pulse.   Gastrointestinal: BS+, soft, NT/ND  Extremities: No peripheral edema  Vascular: 2+ peripheral pulses  Neurological: A/O x 3, CN V-XII grossly intact.  Psychiatric: Normal mood, normal affect  Musculoskeletal: 5/5 strength b/l upper and lower extremities  Skin: No rashes    MEDICATIONS  (STANDING):  enoxaparin Injectable 40 milliGRAM(s) SubCutaneous every 24 hours  gabapentin 300 milliGRAM(s) Oral at bedtime  senna 2 Tablet(s) Oral at bedtime  sertraline 50 milliGRAM(s) Oral daily    MEDICATIONS  (PRN):  acetaminophen   Tablet .. 650 milliGRAM(s) Oral every 6 hours PRN Temp greater or equal to 38C (100.4F), Moderate Pain (4 - 6)      Allergies    No Known Allergies    Intolerances        LABS:                        10.8   8.04  )-----------( 173      ( 30 Jul 2020 07:56 )             35.6     07-30    144  |  107  |  20  ----------------------------<  91  3.2<L>   |  23  |  0.76    Ca    9.0      30 Jul 2020 07:54  Phos  2.7     07-30  Mg     2.0     07-30    TPro  8.2  /  Alb  3.9  /  TBili  0.6  /  DBili  x   /  AST  43<H>  /  ALT  27  /  AlkPhos  82  07-29      Urinalysis Basic - ( 30 Jul 2020 03:44 )    Color: Yellow / Appearance: Clear / SG: >=1.030 / pH: x  Gluc: x / Ketone: 40 mg/dL  / Bili: Small / Urobili: 0.2 E.U./dL   Blood: x / Protein: Trace mg/dL / Nitrite: NEGATIVE   Leuk Esterase: NEGATIVE / RBC: < 5 /HPF / WBC < 5 /HPF   Sq Epi: x / Non Sq Epi: 0-5 /HPF / Bacteria: Present /HPF        RADIOLOGY & ADDITIONAL TESTS: ***INCOMPLETE***  75yoF with a PMH of HTN and depression presents s/p fall at home. Pt states that she lost her balance and fell while attempting to urinate bedside per routine, and could not pick herself back up due to hip pain and decreased leg strength. Normally ambulates with cane at home. Denies LOC, head trauma, prodrome, aura, incontinence, visual disturbances, or tongue biting, no history of seizures or stroke. Could not remember name of home medications. In ED /93, , Na 148, AG 17, BUN/Cr 31/1.22. Troponin I 0.26 --> 0.22, EKG without acute ischemia, CXR clear. Given 2L NS  Admitted for evaluation of fall.     INTERVAL HPI/OVERNIGHT EVENTS:      On further ROS, patient did not have complaint of: Headache, Blurred Vision, Cough, Dyspnea, Palpitations, Abdominal Pain, N/V, Weakness, Change in Sensation.     VITAL SIGNS:  T(F): 98.5 (07-30-20 @ 04:58)  HR: 71 (07-30-20 @ 04:58)  BP: 159/78 (07-30-20 @ 04:58)  RR: 20 (07-30-20 @ 04:58)  SpO2: 97% (07-30-20 @ 04:58)  Wt(kg): --    Input & Output:    07-29-20 @ 07:01  -  07-30-20 @ 07:00  --------------------------------------------------------  IN: 0 mL / OUT: 400 mL / NET: -400 mL        PHYSICAL EXAM:  Constitutional: Patient seated comfortably in bed, of appropriate color, nutrition, and hydration.   HEENT: PERRLA, Normal Range of eye movement with no complaint of diplopia, Normal Hearing  Neck: No LAD, No JVD  Back: Normal spine flexure, No CVA tenderness  Respiratory: Normal air entry, Lungs clear to auscultation b/l w/o wheeze or crepitations.   Cardiovascular: S1 and S2 present - no additional abnormal sounds or murmurs. Normal rhythm and rate of pulse.   Gastrointestinal: BS+, soft, NT/ND  Extremities: No peripheral edema  Vascular: 2+ peripheral pulses  Neurological: A/O x 3, CN V-XII grossly intact.  Psychiatric: Normal mood, normal affect  Musculoskeletal: 5/5 strength b/l upper and lower extremities  Skin: No rashes    MEDICATIONS  (STANDING):  enoxaparin Injectable 40 milliGRAM(s) SubCutaneous every 24 hours  gabapentin 300 milliGRAM(s) Oral at bedtime  senna 2 Tablet(s) Oral at bedtime  sertraline 50 milliGRAM(s) Oral daily    MEDICATIONS  (PRN):  acetaminophen   Tablet .. 650 milliGRAM(s) Oral every 6 hours PRN Temp greater or equal to 38C (100.4F), Moderate Pain (4 - 6)      Allergies    No Known Allergies    Intolerances        LABS:                        10.8   8.04  )-----------( 173      ( 30 Jul 2020 07:56 )             35.6     07-30    144  |  107  |  20  ----------------------------<  91  3.2<L>   |  23  |  0.76    Ca    9.0      30 Jul 2020 07:54  Phos  2.7     07-30  Mg     2.0     07-30    TPro  8.2  /  Alb  3.9  /  TBili  0.6  /  DBili  x   /  AST  43<H>  /  ALT  27  /  AlkPhos  82  07-29      Urinalysis Basic - ( 30 Jul 2020 03:44 )    Color: Yellow / Appearance: Clear / SG: >=1.030 / pH: x  Gluc: x / Ketone: 40 mg/dL  / Bili: Small / Urobili: 0.2 E.U./dL   Blood: x / Protein: Trace mg/dL / Nitrite: NEGATIVE   Leuk Esterase: NEGATIVE / RBC: < 5 /HPF / WBC < 5 /HPF   Sq Epi: x / Non Sq Epi: 0-5 /HPF / Bacteria: Present /HPF        RADIOLOGY & ADDITIONAL TESTS: 75yoF with a PMH of HTN and depression presents s/p fall at home. Pt states that she lost her balance and fell while attempting to urinate bedside per routine, and could not pick herself back up due to hip pain and decreased leg strength. Normally ambulates with cane at home. Denies LOC, head trauma, prodrome, aura, incontinence, visual disturbances, or tongue biting, no history of seizures or stroke. Could not remember name of home medications. In ED /93, , Na 148, AG 17, BUN/Cr 31/1.22. Troponin I 0.26 --> 0.22, EKG without acute ischemia, CXR clear. Given 2L NS  Admitted for evaluation of fall.     INTERVAL HPI/OVERNIGHT EVENTS:  Patient sitting comfortably in bed with complaints of pain in her R groin and superficial skin abrasions. No other complaints this AM.    On further ROS, patient did not have complaint of: Headache, Blurred Vision, Cough, Dyspnea, Palpitations, Abdominal Pain, N/V, Weakness, Change in Sensation.     VITAL SIGNS:  T(F): 98.5 (07-30-20 @ 04:58)  HR: 71 (07-30-20 @ 04:58)  BP: 159/78 (07-30-20 @ 04:58)  RR: 20 (07-30-20 @ 04:58)  SpO2: 97% (07-30-20 @ 04:58)  Wt(kg): --    Input & Output:    07-29-20 @ 07:01  -  07-30-20 @ 07:00  --------------------------------------------------------  IN: 0 mL / OUT: 400 mL / NET: -400 mL        PHYSICAL EXAM:  Constitutional: Patient seated in bed, appears slightly dehydrated still.   HEENT: PERRLA, Normal Range of eye movement with no complaint of diplopia  Neck: No JVD  Back: No CVA tenderness  Respiratory: Normal air entry, Lungs CTA b/l w/o wheeze or crepitations.   Cardiovascular: RRR   Gastrointestinal: BS normoactive all 4 quadrants, soft, NT/ND  Extremities: No peripheral edema  Vascular: 2+ radial and dp b/l pulses  Neurological: A/O x 3, CN V-XII grossly intact.  Psychiatric: Normal mood, normal affect  Musculoskeletal: 5/5 strength b/l upper and 3/5 LLE, unable to assess RLE due to pain in patient hip  Skin: Some irritation without visible rash on patient's lower back and elbows.    MEDICATIONS  (STANDING):  enoxaparin Injectable 40 milliGRAM(s) SubCutaneous every 24 hours  gabapentin 300 milliGRAM(s) Oral at bedtime  senna 2 Tablet(s) Oral at bedtime  sertraline 50 milliGRAM(s) Oral daily    MEDICATIONS  (PRN):  acetaminophen   Tablet .. 650 milliGRAM(s) Oral every 6 hours PRN Temp greater or equal to 38C (100.4F), Moderate Pain (4 - 6)      Allergies    No Known Allergies    Intolerances        LABS:                        10.8   8.04  )-----------( 173      ( 30 Jul 2020 07:56 )             35.6     07-30    144  |  107  |  20  ----------------------------<  91  3.2<L>   |  23  |  0.76    Ca    9.0      30 Jul 2020 07:54  Phos  2.7     07-30  Mg     2.0     07-30    TPro  8.2  /  Alb  3.9  /  TBili  0.6  /  DBili  x   /  AST  43<H>  /  ALT  27  /  AlkPhos  82  07-29      Urinalysis Basic - ( 30 Jul 2020 03:44 )    Color: Yellow / Appearance: Clear / SG: >=1.030 / pH: x  Gluc: x / Ketone: 40 mg/dL  / Bili: Small / Urobili: 0.2 E.U./dL   Blood: x / Protein: Trace mg/dL / Nitrite: NEGATIVE   Leuk Esterase: NEGATIVE / RBC: < 5 /HPF / WBC < 5 /HPF   Sq Epi: x / Non Sq Epi: 0-5 /HPF / Bacteria: Present /HPF        RADIOLOGY & ADDITIONAL TESTS:  < from: Xray Chest 1 View AP/PA (07.29.20 @ 14:22) >  IMPRESSION:  The lungs are clear.    < from: CT Pelvis No Cont (07.29.20 @ 14:16) >  Impression:  No acute pelvic or femoral fracture. Severe osteoarthrosis of the righthip. Right hip joint effusion. Fracture of the sacrococcygeal junction of indeterminate age.    < from: CT Head No Cont (07.29.20 @ 14:16) >  IMPRESSION: No intracranial hemorrhage or calvarial fracture. 75yoF with a PMH of HTN and depression presents s/p fall at home. Pt states that she lost her balance and fell while attempting to urinate bedside per routine, and could not pick herself back up due to hip pain and decreased leg strength. Normally ambulates with cane at home. Denies LOC, head trauma, prodrome, aura, incontinence, visual disturbances, or tongue biting, no history of seizures or stroke. Could not remember name of home medications. In ED /93, , Na 148, AG 17, BUN/Cr 31/1.22. Troponin I 0.26 --> 0.22, EKG without acute ischemia, CXR clear. Given 2L NS  Admitted for evaluation of fall.     INTERVAL HPI/OVERNIGHT EVENTS:  Patient sitting comfortably in bed with complaints of pain in her R groin and superficial skin abrasions. No other complaints this AM. Discharge pending PT evaluation    On further ROS, patient did not have complaint of: Headache, Blurred Vision, Cough, Dyspnea, Palpitations, Abdominal Pain, N/V, Weakness, Change in Sensation.     VITAL SIGNS:  T(F): 98.5 (07-30-20 @ 04:58)  HR: 71 (07-30-20 @ 04:58)  BP: 159/78 (07-30-20 @ 04:58)  RR: 20 (07-30-20 @ 04:58)  SpO2: 97% (07-30-20 @ 04:58)  Wt(kg): --    Input & Output:    07-29-20 @ 07:01  -  07-30-20 @ 07:00  --------------------------------------------------------  IN: 0 mL / OUT: 400 mL / NET: -400 mL        PHYSICAL EXAM:  Constitutional: Patient seated in bed, appears slightly dehydrated still.   HEENT: PERRLA, Normal Range of eye movement with no complaint of diplopia  Neck: No JVD  Back: No CVA tenderness  Respiratory: Normal air entry, Lungs CTA b/l w/o wheeze or crepitations.   Cardiovascular: RRR   Gastrointestinal: BS normoactive all 4 quadrants, soft, NT/ND  Extremities: No peripheral edema  Vascular: 2+ radial and dp b/l pulses  Neurological: A/O x 3, CN V-XII grossly intact.  Psychiatric: Normal mood, normal affect  Musculoskeletal: 5/5 strength b/l upper and 3/5 LLE, unable to assess RLE due to pain in patient hip  Skin: Some irritation without visible rash on patient's lower back and elbows.    MEDICATIONS  (STANDING):  enoxaparin Injectable 40 milliGRAM(s) SubCutaneous every 24 hours  gabapentin 300 milliGRAM(s) Oral at bedtime  senna 2 Tablet(s) Oral at bedtime  sertraline 50 milliGRAM(s) Oral daily    MEDICATIONS  (PRN):  acetaminophen   Tablet .. 650 milliGRAM(s) Oral every 6 hours PRN Temp greater or equal to 38C (100.4F), Moderate Pain (4 - 6)      Allergies    No Known Allergies    Intolerances        LABS:                        10.8   8.04  )-----------( 173      ( 30 Jul 2020 07:56 )             35.6     07-30    144  |  107  |  20  ----------------------------<  91  3.2<L>   |  23  |  0.76    Ca    9.0      30 Jul 2020 07:54  Phos  2.7     07-30  Mg     2.0     07-30    TPro  8.2  /  Alb  3.9  /  TBili  0.6  /  DBili  x   /  AST  43<H>  /  ALT  27  /  AlkPhos  82  07-29      Urinalysis Basic - ( 30 Jul 2020 03:44 )    Color: Yellow / Appearance: Clear / SG: >=1.030 / pH: x  Gluc: x / Ketone: 40 mg/dL  / Bili: Small / Urobili: 0.2 E.U./dL   Blood: x / Protein: Trace mg/dL / Nitrite: NEGATIVE   Leuk Esterase: NEGATIVE / RBC: < 5 /HPF / WBC < 5 /HPF   Sq Epi: x / Non Sq Epi: 0-5 /HPF / Bacteria: Present /HPF        RADIOLOGY & ADDITIONAL TESTS:  < from: Xray Chest 1 View AP/PA (07.29.20 @ 14:22) >  IMPRESSION:  The lungs are clear.    < from: CT Pelvis No Cont (07.29.20 @ 14:16) >  Impression:  No acute pelvic or femoral fracture. Severe osteoarthrosis of the righthip. Right hip joint effusion. Fracture of the sacrococcygeal junction of indeterminate age.    < from: CT Head No Cont (07.29.20 @ 14:16) >  IMPRESSION: No intracranial hemorrhage or calvarial fracture.

## 2020-07-30 NOTE — PHYSICAL THERAPY INITIAL EVALUATION ADULT - GAIT DEVIATIONS NOTED, PT EVAL
poor foot clearance/increased time in double stance/decreased step length/decreased weight-shifting ability

## 2020-07-30 NOTE — PROGRESS NOTE ADULT - PROBLEM SELECTOR PLAN 1
- pt slipped off her bed while trying to urinate in a hanna jar at home, based on history sounds like mechanical fall.   - unclear how much time she spent on floor because being found  - Pt denies LOC, vagal prodrome, or head trauma  - CT head and pelvis both negative  - EKG did not reveal any conduction disease  - PT consult in AM - pt had mechanical fall yesterday attempting to urinate.   - On floor for >20 hours before found  - Pt denies LOC, vagal prodrome, aura, incontinence, or head trauma  - CT head and pelvis both negative  - EKG did not reveal any conduction disease  - PT recommends SUDHEER, but patient refusing, need for PT to evaluate possibility of in home pt

## 2020-07-30 NOTE — PROGRESS NOTE ADULT - PROBLEM SELECTOR PLAN 3
-  s/p being found down s/p fall  - s/p 2L NS in ED  - will c/w IVF and trend CK CK technically does not met criteria for rhabdo, but followed  - ->400  - s/p 2L NS in ED

## 2020-07-30 NOTE — CONSULT NOTE ADULT - ASSESSMENT
per Internal Medicine    75yoF with a PMH of HTN and depression presents s/p fall at home.    Problem/Plan - 1:  ·  Problem: Fall.  Plan: - pt slipped off her bed while trying to urinate in a hanna jar at home, based on history sounds like mechanical fall.   - unclear how much time she spent on floor because being found  - Pt denies LOC, vagal prodrome, or head trauma  - CT head and pelvis both negative  - EKG did not reveal any conduction disease  - PT consult in AM.     Problem/Plan - 2:  ·  Problem: Elevated troponin.  Plan: - elevated troponin in setting of missed BP meds  - no chest pain or cardiac complaints  - EKG without acute ischemia  - unlikely a coronary event  - will trend troponin.     Problem/Plan - 3:  ·  Problem: Rhabdomyolysis.  Plan: -  s/p being found down s/p fall  - s/p 2L NS in ED  - will c/w IVF and trend CK.     Problem/Plan - 4:  ·  Problem: Hypernatremia.  Plan: - Na 148 on admission likely 2/2 decreased PO intake, down to 146 s/p 2L  - will c/w IVF and trend chemistry.     Problem/Plan - 5:  ·  Problem: Azotemia.  Plan: - BUN/Cr 31/1.22 likely 2/2 decreased PO intake  - will c/w fluids    # Anion Gap  - AG 17 likely ketosis based on UA  - will feed and give some D5 tonight.     Problem/Plan - 6:  Problem: HTN (hypertension). Plan: - takes meds at home, doesn't know what  - SBP in 170's on arrival  - will give one dose of hydral 25 to bridge until morning  - med rec in AM.    Problem/Plan - 7:  ·  Problem: Depression.  Plan: - h/o depression used to take zoloft  - no SI or HI right now  - med rec in AM.     Problem/Plan - 8:  ·  Problem: Preventative health care.  Plan: .  F: 1L D5 1/2NS  E: PRN  N: DASH  DVT: lovenox  GI: none  bowel: senna  glucose: none  pain: tylenol  dispo: RMF.

## 2020-07-30 NOTE — PHYSICAL THERAPY INITIAL EVALUATION ADULT - ADDITIONAL COMMENTS
Pt reports living alone in an elevator access apartment, denies stairs. Pt reports being independent with all ADL's and functional mobility using a SC.

## 2020-07-30 NOTE — CONSULT NOTE ADULT - SUBJECTIVE AND OBJECTIVE BOX
Patient is a 75y old  Female who presents with a chief complaint of Fall (30 Jul 2020 11:34)       HPI:  75yoF with a PMH of HTN and depression presents s/p fall at home. Pt states that she keeps hanna jars near her to bed to urinate at night so that she doesn't have to walk to the bathroom. While trying to use one of these hanna jars last night she fell off her bed and urinated on the floor. She denies LOC however this was an unwitnessed event. She denies head trauma. ROS negative for fever, chills, chest pain, SOB, n/v/c/d, abd pain, dysuria, urinary frequency, numbness, or tingling. She reports no problems with ADL's. Can't remember the names of her medications but says she uses Duane Reade.     PMH: as above  meds: she can't remember, but takes BP meds. By her description takes a diuretic  PSH: none  Allergies: NKDA  SH: denies smoking or drinking. Lives alone on [address] [place] [address]    ED  VS: T 99.7, /93, , RR 16 sat 98% on room air  Labs: WBC 10, hgb 12.7, , Na 148, AG 17, BUN/Cr 31/1.22. Troponin I 0.26 --> 0.22 s/p 2L. UA dirty  Studies: EKG without acute ischemia  Imaging: CXR clear  Intervention: 2L NS (29 Jul 2020 20:54)      PAST MEDICAL & SURGICAL HISTORY:  No pertinent past medical history      MEDICATIONS  (STANDING):  enoxaparin Injectable 40 milliGRAM(s) SubCutaneous every 24 hours  gabapentin 300 milliGRAM(s) Oral at bedtime  senna 2 Tablet(s) Oral at bedtime  sertraline 50 milliGRAM(s) Oral daily    MEDICATIONS  (PRN):  acetaminophen   Tablet .. 650 milliGRAM(s) Oral every 6 hours PRN Temp greater or equal to 38C (100.4F), Moderate Pain (4 - 6)      Social History:           -  Home Living Status: lives alone in an elevator accessible apartment building           -  Prior Home Care Services:  none    Baseline Functional Level Prior to Admission:           - ADL's:    independent           - ambulatory status PTA:  walked with a cane    FAMILY HISTORY:      CBC Full  -  ( 30 Jul 2020 07:56 )  WBC Count : 8.04 K/uL  RBC Count : 4.03 M/uL  Hemoglobin : 10.8 g/dL  Hematocrit : 35.6 %  Platelet Count - Automated : 173 K/uL  Mean Cell Volume : 88.3 fl  Mean Cell Hemoglobin : 26.8 pg  Mean Cell Hemoglobin Concentration : 30.3 gm/dL  Auto Neutrophil # : x  Auto Lymphocyte # : x  Auto Monocyte # : x  Auto Eosinophil # : x  Auto Basophil # : x  Auto Neutrophil % : x  Auto Lymphocyte % : x  Auto Monocyte % : x  Auto Eosinophil % : x  Auto Basophil % : x      07-30    144  |  107  |  20  ----------------------------<  91  3.2<L>   |  23  |  0.76    Ca    9.0      30 Jul 2020 07:54  Phos  2.7     07-30  Mg     2.0     07-30    TPro  8.2  /  Alb  3.9  /  TBili  0.6  /  DBili  x   /  AST  43<H>  /  ALT  27  /  AlkPhos  82  07-29      Urinalysis Basic - ( 30 Jul 2020 03:44 )    Color: Yellow / Appearance: Clear / SG: >=1.030 / pH: x  Gluc: x / Ketone: 40 mg/dL  / Bili: Small / Urobili: 0.2 E.U./dL   Blood: x / Protein: Trace mg/dL / Nitrite: NEGATIVE   Leuk Esterase: NEGATIVE / RBC: < 5 /HPF / WBC < 5 /HPF   Sq Epi: x / Non Sq Epi: 0-5 /HPF / Bacteria: Present /HPF          Radiology:    < from: Xray Chest 1 View AP/PA (07.29.20 @ 14:22) >  EXAM:  XR CHEST AP OR PA 1V                           PROCEDURE DATE:  07/29/2020          INTERPRETATION:  PORTABLE CHEST X-RAY    HISTORY: admisison    PRIOR STUDIES: No prior studies are available for comparison.    FINDINGS: The lungs are clear.  There are no pleural effusions.  The cardiomediastinal silhouette is unremarkable. Appears to be soft tissue calcification involving the bilateral chest wall. Bony chest cage appears to be intact.    IMPRESSION:  The lungs are clear.        < from: CT Head No Cont (07.29.20 @ 14:16) >  EXAM:  CT BRAIN                           PROCEDURE DATE:  07/29/2020          INTERPRETATION:  PROCEDURE: CT head without intravenous contrast    INDICATIONS: Fall, headache.    TECHNIQUE:  Serial axial images were obtained from the skull base to the vertex without the use of intravenous contrast. Coronal and sagittal reconstructions were created.    COMPARISON EXAMINATION: None.    FINDINGS:  VENTRICLES AND SULCI: Enlargement compatible with volume loss. No hydrocephalus.  INTRA-AXIAL No acute hemorrhage, midline shift, or acute transcortical infarction present. Confluent patchy hypodensity within the periventricular white matter which are likely due to chronic small vessel ischemic disease.  EXTRA-AXIAL: No extra-axial fluid collection is present.  VISUALIZED SINUSES: No air-fluid levels are identified.  VISUALIZED MASTOIDS: Well aerated.  CALVARIUM: No acute calvarial fracture  MISCELLANEOUS:  None.    IMPRESSION: No intracranial hemorrhage or calvarial fracture.        < from: CT Pelvis No Cont (07.29.20 @ 14:16) >  EXAM:  CT PELVIS ONLY                           PROCEDURE DATE:  07/29/2020          INTERPRETATION:  CT PELVIS dated 7/29/2020 2:16 PM.    TECHNIQUE: Axial computed tomography images of the pelvis without intravenous contrast were obtained. Coronal and sagittal reformatted images were created and reviewed.    INDICATION: Right hip pain status post fall today    COMPARISON: None    FINDINGS: There is no acute pelvic or femoral fracture or dislocation. The  sacroiliac joints are intact.  There are severe degenerative changes of the right hip which appear chronic. There is bone-on-bone joint space narrowing with deformation of the right femoral head (without collapse) and osteophytosis. The left joint space is preserved. Osteopenia. Old fracture right superior pubic ramus. Right hip joint effusion. Fracture of the sacrococcygeal junction of indeterminate age.    There are degenerative changes of the lower lumbar spine with grade 1 anterolisthesis of L5 on S1. There is lumbarization of theS1 vertebral body.    Heavy calcified plaque aorta and branch vessels. Colonic diverticulosis. Uterus unremarkable. No adnexal mass. Urinary bladder unremarkable.    Impression:    No acute pelvic or femoral fracture. Severe osteoarthrosis of the right hip. Right hip joint effusion. Fracture of the sacrococcygeal junction of indeterminate age.                Vital Signs Last 24 Hrs  T(C): 36.9 (30 Jul 2020 04:58), Max: 37.2 (29 Jul 2020 19:54)  T(F): 98.5 (30 Jul 2020 04:58), Max: 99 (29 Jul 2020 20:55)  HR: 71 (30 Jul 2020 04:58) (71 - 86)  BP: 159/78 (30 Jul 2020 04:58) (159/78 - 192/75)  BP(mean): --  RR: 20 (30 Jul 2020 04:58) (16 - 21)  SpO2: 97% (30 Jul 2020 04:58) (94% - 98%)    REVIEW OF SYSTEMS: per HPI          Physical Exam:  74 yo AA woman lying comfortably in bed, no acute complaints    Head: normocephalic, atraumatic    Eyes: PERRLA, EOMI, no nystagmus, sclera anicteric    ENT: nasal discharge, uvula midline, no oropharyngeal erythema/exudate    Neck: supple, negative JVD, negative carotid bruits, no thyromegaly    Chest: CTA bilaterally, neg wheeze/ rhonchi/ rales/ crackles/ egophany    Cardiovascular: regular rate and rhythm, neg murmurs/rubs/gallops    Abdomen: soft, non distended, non tender to palpation in all 4 quadrants, negative rebound/guarding, normal bowel sounds    Extremities: WWP, neg cyanosis/clubbing/edema, negative calf tenderness to palpation, negative Sofiya's sign    Musculoskeletal: reduced IR/ER of right hip c/w OA      :     Neurologic Exam:    Alert and oriented to person, place, date/year, speech fluent w/o dysarthria, recent and remote memory intact, repetition intact, comprehension intact    Cranial Nerves:     II:                       pupils equal, round and reactive to light, visual fields intact   III/ IV/VI:            extraocular movements intact, neg nystagmus, neg ptosis  V:                       facial sensation intact, V1-3 normal  VII:                     face symmetric, no droop, normal eye closure and smile  VIII:                    hearing intact to finger rub bilaterally  IX/ X:                 soft palate rise symmetrical  XI:                      head turning, shoulder shrug normal  XII:                     tongue midline    Motor Exam:    Upper Extremities:     Right:    no focal weakness > 3+/5    Left:       no focal weakness > 3+/5      Lower Extremities:    Right:     no focal weakness > 3+/5    Left :     no focal weakness > 3+/5                 Sensory:    intact to LT/PP in all UE/LE dermatomes                     DTR:            = biceps/     triceps/     brachioradialis                      = patella/   medial hamstring/ankle                      neg clonus                      neg Babinski                          Gait:  not tested        PM&R Impression:    1) s/p fall  2) deconditioned  3) no focal weakness    Plan:    1) Physical therapy focusing on therapeutic exercises, bed mobility/transfer out of bed evaluation, progressive ambulation with assistive devices prn.    2) Anticipated Disposition Plan/Recs:    subacute rehab placement

## 2020-07-30 NOTE — DISCHARGE NOTE PROVIDER - CARE PROVIDER_API CALL
[facility]  [address]  Phone: [phone]  Fax: (   )    -  Established Patient  Follow Up Time: 1 week

## 2020-07-30 NOTE — PHYSICAL THERAPY INITIAL EVALUATION ADULT - PHYSICAL ASSIST/NONPHYSICAL ASSIST: SIT/SUPINE, REHAB EVAL
1 person assist/nonverbal cues (demo/gestures)/verbal cues [As Noted in HPI] : as noted in HPI [Negative] : Endocrine [Fever] : no fever [Chills] : no chills [Eye Pain] : no eye pain [Earache] : no earache [Nosebleeds] : no nosebleeds [Chest Pain] : no chest pain [Shortness Of Breath] : no shortness of breath [Wheezing] : no wheezing [Cough] : no cough [Abdominal Pain] : no abdominal pain [Vomiting] : no vomiting [Dysuria] : no dysuria [Joint Pain] : no joint pain [Skin Lesions] : no skin lesions [Dizziness] : no dizziness [Anxiety] : no anxiety [Easy Bleeding] : no tendency for easy bleeding [de-identified] : mood much better in peru -  [FreeTextEntry1] : missed period

## 2020-07-30 NOTE — PHYSICAL THERAPY INITIAL EVALUATION ADULT - IMPAIRMENTS FOUND, PT EVAL
aerobic capacity/endurance/gait, locomotion, and balance/decreased midline orientation/muscle strength/posture

## 2020-07-30 NOTE — PHYSICAL THERAPY INITIAL EVALUATION ADULT - PLANNED THERAPY INTERVENTIONS, PT EVAL
gait training/balance training/postural re-education/neuromuscular re-education/strengthening/bed mobility training/transfer training

## 2020-07-30 NOTE — DISCHARGE NOTE PROVIDER - PROVIDER TOKENS
FREE:[LAST:[Meek],FIRST:[Brennan],PHONE:[phone]],FAX:[(   )    -],ADDRESS:[[facility]],FOLLOWUP:[1 week],ESTABLISHEDPATIENT:[T]]

## 2020-07-30 NOTE — PROGRESS NOTE ADULT - PROBLEM SELECTOR PLAN 2
- elevated troponin in setting of missed BP meds  - no chest pain or cardiac complaints  - EKG without acute ischemia  - unlikely a coronary event  - will trend troponin - elevated troponin in setting of missed BP meds  - no chest pain or cardiac complaints  - EKG without acute ischemia  - unlikely a coronary event  - troponins trended down

## 2020-07-30 NOTE — PROGRESS NOTE ADULT - PROBLEM SELECTOR PLAN 8
.  F: 1L D5 1/2NS  E: PRN  N: DASH  DVT: lovenox  GI: none  bowel: senna  glucose: none  pain: tylenol  dispo: RMF

## 2020-07-30 NOTE — PROGRESS NOTE ADULT - PROBLEM SELECTOR PLAN 4
- Na 148 on admission likely 2/2 decreased PO intake, down to 146 s/p 2L  - will c/w IVF and trend chemistry - Na 148 on admission likely 2/2 decreased PO intake, resolved to 144

## 2020-07-30 NOTE — PROGRESS NOTE ADULT - PROBLEM SELECTOR PLAN 7
- h/o depression used to take zoloft  - no SI or HI right now  - med rec in AM - restarted home zoloft 50  - no SI or HI right now

## 2020-07-30 NOTE — PHYSICAL THERAPY INITIAL EVALUATION ADULT - GENERAL OBSERVATIONS, REHAB EVAL
Pt found semi supine in bed in NAD, +hep lock, +primafit, agreeable to PT Eval and cleared by AHMET Escalante and MD Rosales given elevated trop.

## 2020-07-31 VITALS
DIASTOLIC BLOOD PRESSURE: 58 MMHG | OXYGEN SATURATION: 95 % | HEART RATE: 87 BPM | TEMPERATURE: 98 F | RESPIRATION RATE: 19 BRPM | SYSTOLIC BLOOD PRESSURE: 144 MMHG

## 2020-07-31 LAB
CULTURE RESULTS: SIGNIFICANT CHANGE UP
SPECIMEN SOURCE: SIGNIFICANT CHANGE UP

## 2020-07-31 PROCEDURE — 99285 EMERGENCY DEPT VISIT HI MDM: CPT

## 2020-07-31 PROCEDURE — 80053 COMPREHEN METABOLIC PANEL: CPT

## 2020-07-31 PROCEDURE — 97161 PT EVAL LOW COMPLEX 20 MIN: CPT

## 2020-07-31 PROCEDURE — 99239 HOSP IP/OBS DSCHRG MGMT >30: CPT | Mod: GC

## 2020-07-31 PROCEDURE — 93005 ELECTROCARDIOGRAM TRACING: CPT

## 2020-07-31 PROCEDURE — 72192 CT PELVIS W/O DYE: CPT

## 2020-07-31 PROCEDURE — 97116 GAIT TRAINING THERAPY: CPT

## 2020-07-31 PROCEDURE — 83605 ASSAY OF LACTIC ACID: CPT

## 2020-07-31 PROCEDURE — 87086 URINE CULTURE/COLONY COUNT: CPT

## 2020-07-31 PROCEDURE — 84484 ASSAY OF TROPONIN QUANT: CPT

## 2020-07-31 PROCEDURE — 82550 ASSAY OF CK (CPK): CPT

## 2020-07-31 PROCEDURE — 83735 ASSAY OF MAGNESIUM: CPT

## 2020-07-31 PROCEDURE — 71045 X-RAY EXAM CHEST 1 VIEW: CPT

## 2020-07-31 PROCEDURE — U0003: CPT

## 2020-07-31 PROCEDURE — 85025 COMPLETE CBC W/AUTO DIFF WBC: CPT

## 2020-07-31 PROCEDURE — 84100 ASSAY OF PHOSPHORUS: CPT

## 2020-07-31 PROCEDURE — 81001 URINALYSIS AUTO W/SCOPE: CPT

## 2020-07-31 PROCEDURE — 85027 COMPLETE CBC AUTOMATED: CPT

## 2020-07-31 PROCEDURE — 70450 CT HEAD/BRAIN W/O DYE: CPT

## 2020-07-31 PROCEDURE — 36415 COLL VENOUS BLD VENIPUNCTURE: CPT

## 2020-07-31 PROCEDURE — 97530 THERAPEUTIC ACTIVITIES: CPT

## 2020-07-31 PROCEDURE — 80048 BASIC METABOLIC PNL TOTAL CA: CPT

## 2020-07-31 RX ADMIN — SERTRALINE 50 MILLIGRAM(S): 25 TABLET, FILM COATED ORAL at 11:56

## 2020-07-31 RX ADMIN — Medication 650 MILLIGRAM(S): at 13:13

## 2020-07-31 RX ADMIN — Medication 0.1 MILLIGRAM(S): at 05:33

## 2020-07-31 RX ADMIN — ENOXAPARIN SODIUM 40 MILLIGRAM(S): 100 INJECTION SUBCUTANEOUS at 05:33

## 2020-07-31 RX ADMIN — Medication 650 MILLIGRAM(S): at 14:13

## 2020-07-31 NOTE — PROGRESS NOTE ADULT - SUBJECTIVE AND OBJECTIVE BOX
Physical Medicine and Rehabilitation Progress Note:    Patient is a 75y old  Female who presents with a chief complaint of Mechanical fall (30 Jul 2020 16:38)      HPI:  75yoF with a PMH of HTN and depression presents s/p fall at home. Pt states that she keeps hanna jars near her to bed to urinate at night so that she doesn't have to walk to the bathroom. While trying to use one of these hanna jars last night she fell off her bed and urinated on the floor. She denies LOC however this was an unwitnessed event. She denies head trauma. ROS negative for fever, chills, chest pain, SOB, n/v/c/d, abd pain, dysuria, urinary frequency, numbness, or tingling. She reports no problems with ADL's. Can't remember the names of her medications but says she uses Duane Reade.     PMH: as above  meds: she can't remember, but takes BP meds. By her description takes a diuretic  PSH: none  Allergies: NKDA  SH: denies smoking or drinking. Lives alone on [address] [place] [address]    ED  VS: T 99.7, /93, , RR 16 sat 98% on room air  Labs: WBC 10, hgb 12.7, , Na 148, AG 17, BUN/Cr 31/1.22. Troponin I 0.26 --> 0.22 s/p 2L. UA dirty  Studies: EKG without acute ischemia  Imaging: CXR clear  Intervention: 2L NS (29 Jul 2020 20:54)                            10.8   8.04  )-----------( 173      ( 30 Jul 2020 07:56 )             35.6       07-30    144  |  107  |  20  ----------------------------<  91  3.2<L>   |  23  |  0.76    Ca    9.0      30 Jul 2020 07:54  Phos  2.7     07-30  Mg     2.0     07-30    TPro  8.2  /  Alb  3.9  /  TBili  0.6  /  DBili  x   /  AST  43<H>  /  ALT  27  /  AlkPhos  82  07-29    Vital Signs Last 24 Hrs  T(C): 37 (31 Jul 2020 06:03), Max: 37 (31 Jul 2020 06:03)  T(F): 98.6 (31 Jul 2020 06:03), Max: 98.6 (31 Jul 2020 06:03)  HR: 65 (31 Jul 2020 06:03) (65 - 86)  BP: 154/74 (31 Jul 2020 06:03) (154/74 - 172/72)  BP(mean): --  RR: 18 (31 Jul 2020 06:03) (18 - 19)  SpO2: 99% (31 Jul 2020 06:03) (96% - 99%)    MEDICATIONS  (STANDING):  cloNIDine 0.1 milliGRAM(s) Oral two times a day  enoxaparin Injectable 40 milliGRAM(s) SubCutaneous every 24 hours  gabapentin 300 milliGRAM(s) Oral at bedtime  senna 2 Tablet(s) Oral at bedtime  sertraline 50 milliGRAM(s) Oral daily    MEDICATIONS  (PRN):  acetaminophen   Tablet .. 650 milliGRAM(s) Oral every 6 hours PRN Temp greater or equal to 38C (100.4F), Moderate Pain (4 - 6)    Currently Undergoing Physical/ Occupational Therapy at bedside.    Functional Status Assessment:   7/30/2020    Previous Level of Function:     · Ambulation Skills	independent; needs device	  · Transfer Skills	independent; needs device	  · ADL Skills	independent	  · Additional Comments	Pt reports living alone in an elevator access apartment, denies stairs. Pt reports being independent with all ADL's and functional mobility using a SC.	    Cognitive Status Examination:   · Orientation	oriented to person, place, time and situation	  · Level of Consciousness	alert	  · Follows Commands and Answers Questions	100% of the time; able to follow single-step instructions	  · Personal Safety and Judgment	intact	    Range of Motion Exam:   · Active Range of Motion Examination	bilateral  lower extremity Active ROM was WFL (within functional limits); pain with LE AROM	    Manual Muscle Testing:   · Manual Muscle Testing Results	grossly >3/5 t/o b/l LEs based on functional mobility against gravity	    Bed Mobility: Sit to Supine:     · Level of [place]	moderate assist (50% patients effort); maximum assist (25% patients effort)	  · Physical Assist/Nonphysical Assist	1 person assist; nonverbal cues (demo/gestures); verbal cues	    Bed Mobility: Supine to Sit:     · Level of [place]	moderate assist (50% patients effort); maximum assist (25% patients effort)	  · Physical Assist/Nonphysical Assist	1 person assist; nonverbal cues (demo/gestures); verbal cues	    Bed Mobility Analysis:     · Bed Mobility Limitations	decreased ability to use arms for pushing/pulling; decreased ability to use legs for bridging/pushing; impaired ability to control trunk for mobility	  · Impairments Contributing to Impaired Bed Mobility	impaired balance; impaired postural control; pain; decreased strength	    Transfer: Sit to Stand:     · Level of [place]	moderate assist (50% patients effort); maximum assist (25% patients effort)	  · Physical Assist/Nonphysical Assist	1 person assist; verbal cues; nonverbal cues (demo/gestures)	  · Weight-Bearing Restrictions	weight-bearing as tolerated	  · Assistive Device	straight cane	    Transfer: Stand to Sit:     · Level of [place]	moderate assist (50% patients effort)	  · Physical Assist/Nonphysical Assist	1 person assist; nonverbal cues (demo/gestures); verbal cues	  · Weight-Bearing Restrictions	weight-bearing as tolerated	  · Assistive Device	straight cane	    Sit/Stand Transfer Safety Analysis:     · Transfer Safety Concerns Noted	decreased safety awareness; losing balance; decreased weight-shifting ability	  · Impairments Contributing to Impaired Transfers	impaired balance; impaired coordination; pain; decreased strength	    Gait Skills:     · Level of [place]	moderate assist (50% patients effort)	  · Physical Assist/Nonphysical Assist	1 person assist; nonverbal cues (demo/gestures); verbal cues	  · Weight-Bearing Restrictions	weight-bearing as tolerated	  · Assistive Device	straight cane	  · Gait Distance	4 shuffling side steps	    Gait Analysis:     · Gait Pattern Used	3-point gait	  · Gait Deviations Noted	increased time in double stance; decreased step length; decreased weight-shifting ability; poor foot clearance	  · Impairments Contributing to Gait Deviations	impaired balance; impaired coordination; impaired postural control; pain; decreased strength	    Balance Skills Assessment:     · Sitting Balance: Static	good balance	  · Sitting Balance: Dynamic	good balance	  · Sit-to-Stand Balance	poor balance	  · Standing Balance: Static	poor balance	  · Standing Balance: Dynamic	poor balance	  · Systems Impairment Contributing to Balance Disturbance	musculoskeletal	  · Identified Impairments Contributing to Balance Disturbance	decreased strength; pain	    Clinical Impressions:   · Criteria for Skilled Therapeutic Interventions	impairments found; functional limitations in following categories; risk reduction/prevention; rehab potential; therapy frequency; anticipated discharge recommendation	  · Impairments Found (describe specific impairments)	aerobic capacity/endurance; decreased midline orientation; muscle strength; gait, locomotion, and balance; posture	  · Functional Limitations in Following Categories (describe specific limitations)	self-care; home management; community/leisure	  · Risk Reduction/Prevention (Describe Specific Areas of risk reduction/prevention)	risk factors; recurrence of condition	  · Risk Areas	fall	  · Rehab Potential	good, to achieve stated therapy goals	  · Therapy Frequency	2-3x/week	        PM&R Impression: as above    Current Disposition Plan Recommendations:    subacute rehab placement

## 2020-07-31 NOTE — PROGRESS NOTE ADULT - ASSESSMENT
per Internal Medicine    75yoF with a PMH of HTN and depression presents s/p fall at home.    Problem/Plan - 1:  ·  Problem: Fall.  Plan: - pt had mechanical fall yesterday attempting to urinate.   - On floor for >20 hours before found  - Pt denies LOC, vagal prodrome, aura, incontinence, or head trauma  - CT head and pelvis both negative  - EKG did not reveal any conduction disease  - PT recommends SUDHEER, but patient refusing, need for PT to evaluate possibility of in home pt.    Problem/Plan - 2:  ·  Problem: Elevated troponin.  Plan: - elevated troponin in setting of missed BP meds  - no chest pain or cardiac complaints  - EKG without acute ischemia  - unlikely a coronary event  - troponins trended down.    Problem/Plan - 3:  ·  Problem: Rhabdomyolysis.  Plan: CK technically does not met criteria for rhabdo, but followed  - ->400  - s/p 2L NS in ED.    Problem/Plan - 4:  ·  Problem: Hypernatremia.  Plan: - Na 148 on admission likely 2/2 decreased PO intake, resolved to 144.    Problem/Plan - 5:  ·  Problem: Azotemia.  Plan: - BUN/Cr 31/1.22 likely 2/2 decreased PO intake  - resolved to 20/0.76    # Anion Gap  - AG 17 likely ketosis based on UA  - will feed and give some D5 tonight.    Problem/Plan - 6:  Problem: HTN (hypertension). Plan: - restarted home clonidine 0.1 mg  - SBP in 170's on arrival, given one dose hydral 25.    Problem/Plan - 7:  ·  Problem: Depression.  Plan: - restarted home zoloft 50  - no SI or HI right now.    Problem/Plan - 8:  ·  Problem: Preventative health care.  Plan: .  F: 1L D5 1/2NS  E: PRN  N: DASH  DVT: lovenox  GI: none  bowel: senna  glucose: none  pain: tylenol  dispo: RMF.
75yoF with a PMH of HTN and depression presents s/p fall at home.

## 2020-07-31 NOTE — DISCHARGE NOTE NURSING/CASE MANAGEMENT/SOCIAL WORK - PATIENT PORTAL LINK FT
You can access the FollowMyHealth Patient Portal offered by [facility] by registering at the following website: [email or web address]. By joining [employer]’s FollowMyHealth portal, you will also be able to view your health information using other applications (apps) compatible with our system.

## 2020-08-04 DIAGNOSIS — M62.82 RHABDOMYOLYSIS: ICD-10-CM

## 2020-08-04 DIAGNOSIS — I10 ESSENTIAL (PRIMARY) HYPERTENSION: ICD-10-CM

## 2020-08-04 DIAGNOSIS — F32.9 MAJOR DEPRESSIVE DISORDER, SINGLE EPISODE, UNSPECIFIED: ICD-10-CM

## 2020-08-04 DIAGNOSIS — N17.9 ACUTE KIDNEY FAILURE, UNSPECIFIED: ICD-10-CM

## 2020-08-04 DIAGNOSIS — E86.0 DEHYDRATION: ICD-10-CM

## 2020-08-04 DIAGNOSIS — E87.0 HYPEROSMOLALITY AND HYPERNATREMIA: ICD-10-CM

## 2020-08-04 DIAGNOSIS — R79.89 OTHER SPECIFIED ABNORMAL FINDINGS OF BLOOD CHEMISTRY: ICD-10-CM

## 2020-11-16 NOTE — ED ADULT NURSE NOTE - NS PRO PASSIVE SMOKE EXP
No Detail Level: Simple Additional Notes: Patient consent was obtained to proceed with the visit and recommended plan of care after discussion of all risks and benefits, including the risks of COVID-19 exposure.

## 2022-09-01 NOTE — PHYSICAL THERAPY INITIAL EVALUATION ADULT - PHYSICAL ASSIST/NONPHYSICAL ASSIST: SIT/STAND, REHAB EVAL
verbal cues/1 person assist/nonverbal cues (demo/gestures) Hemigard Postcare Instructions: The HEMIGARD strips are to remain completely dry for at least 5-7 days.

## 2024-04-18 NOTE — PATIENT PROFILE ADULT - NSPROPTRIGHTSUPPORTPERSON_GEN_A_NUR
She is requesting verbal orders to bring in OT 2 x week x 2 weeks then 1 x week x 1 week.    declines

## 2025-05-24 ENCOUNTER — EMERGENCY (EMERGENCY)
Facility: HOSPITAL | Age: 80
LOS: 1 days | End: 2025-05-24
Attending: EMERGENCY MEDICINE | Admitting: EMERGENCY MEDICINE
Payer: MEDICAID

## 2025-05-24 VITALS
DIASTOLIC BLOOD PRESSURE: 64 MMHG | SYSTOLIC BLOOD PRESSURE: 111 MMHG | HEART RATE: 95 BPM | OXYGEN SATURATION: 99 % | RESPIRATION RATE: 18 BRPM | HEIGHT: 66 IN | TEMPERATURE: 98 F

## 2025-05-24 VITALS
RESPIRATION RATE: 18 BRPM | HEART RATE: 65 BPM | DIASTOLIC BLOOD PRESSURE: 62 MMHG | SYSTOLIC BLOOD PRESSURE: 109 MMHG | OXYGEN SATURATION: 98 % | TEMPERATURE: 98 F

## 2025-05-24 LAB
ANION GAP SERPL CALC-SCNC: 10 MMOL/L — SIGNIFICANT CHANGE UP (ref 5–17)
BASOPHILS # BLD AUTO: 0.03 K/UL — SIGNIFICANT CHANGE UP (ref 0–0.2)
BASOPHILS NFR BLD AUTO: 0.4 % — SIGNIFICANT CHANGE UP (ref 0–2)
BLD GP AB SCN SERPL QL: NEGATIVE — SIGNIFICANT CHANGE UP
BUN SERPL-MCNC: 12 MG/DL — SIGNIFICANT CHANGE UP (ref 7–23)
CALCIUM SERPL-MCNC: 8.6 MG/DL — SIGNIFICANT CHANGE UP (ref 8.4–10.5)
CHLORIDE SERPL-SCNC: 111 MMOL/L — HIGH (ref 96–108)
CO2 SERPL-SCNC: 24 MMOL/L — SIGNIFICANT CHANGE UP (ref 22–31)
CREAT SERPL-MCNC: 0.48 MG/DL — LOW (ref 0.5–1.3)
EGFR: 95 ML/MIN/1.73M2 — SIGNIFICANT CHANGE UP
EGFR: 95 ML/MIN/1.73M2 — SIGNIFICANT CHANGE UP
EOSINOPHIL # BLD AUTO: 0.22 K/UL — SIGNIFICANT CHANGE UP (ref 0–0.5)
EOSINOPHIL NFR BLD AUTO: 2.7 % — SIGNIFICANT CHANGE UP (ref 0–6)
GLUCOSE SERPL-MCNC: 89 MG/DL — SIGNIFICANT CHANGE UP (ref 70–99)
HCT VFR BLD CALC: 27.2 % — LOW (ref 34.5–45)
HGB BLD-MCNC: 8.3 G/DL — LOW (ref 11.5–15.5)
IMM GRANULOCYTES NFR BLD AUTO: 0.4 % — SIGNIFICANT CHANGE UP (ref 0–0.9)
LYMPHOCYTES # BLD AUTO: 2.15 K/UL — SIGNIFICANT CHANGE UP (ref 1–3.3)
LYMPHOCYTES # BLD AUTO: 26.5 % — SIGNIFICANT CHANGE UP (ref 13–44)
MCHC RBC-ENTMCNC: 28.4 PG — SIGNIFICANT CHANGE UP (ref 27–34)
MCHC RBC-ENTMCNC: 30.5 G/DL — LOW (ref 32–36)
MCV RBC AUTO: 93.2 FL — SIGNIFICANT CHANGE UP (ref 80–100)
MONOCYTES # BLD AUTO: 0.66 K/UL — SIGNIFICANT CHANGE UP (ref 0–0.9)
MONOCYTES NFR BLD AUTO: 8.1 % — SIGNIFICANT CHANGE UP (ref 2–14)
NEUTROPHILS # BLD AUTO: 5.03 K/UL — SIGNIFICANT CHANGE UP (ref 1.8–7.4)
NEUTROPHILS NFR BLD AUTO: 61.9 % — SIGNIFICANT CHANGE UP (ref 43–77)
NRBC BLD AUTO-RTO: 0 /100 WBCS — SIGNIFICANT CHANGE UP (ref 0–0)
PLATELET # BLD AUTO: 206 K/UL — SIGNIFICANT CHANGE UP (ref 150–400)
POTASSIUM SERPL-MCNC: 3.8 MMOL/L — SIGNIFICANT CHANGE UP (ref 3.5–5.3)
POTASSIUM SERPL-SCNC: 3.8 MMOL/L — SIGNIFICANT CHANGE UP (ref 3.5–5.3)
RBC # BLD: 2.92 M/UL — LOW (ref 3.8–5.2)
RBC # FLD: 17.8 % — HIGH (ref 10.3–14.5)
RH IG SCN BLD-IMP: POSITIVE — SIGNIFICANT CHANGE UP
RH IG SCN BLD-IMP: POSITIVE — SIGNIFICANT CHANGE UP
SODIUM SERPL-SCNC: 145 MMOL/L — SIGNIFICANT CHANGE UP (ref 135–145)
WBC # BLD: 8.12 K/UL — SIGNIFICANT CHANGE UP (ref 3.8–10.5)
WBC # FLD AUTO: 8.12 K/UL — SIGNIFICANT CHANGE UP (ref 3.8–10.5)

## 2025-05-24 PROCEDURE — 80048 BASIC METABOLIC PNL TOTAL CA: CPT

## 2025-05-24 PROCEDURE — 86900 BLOOD TYPING SEROLOGIC ABO: CPT

## 2025-05-24 PROCEDURE — 86850 RBC ANTIBODY SCREEN: CPT

## 2025-05-24 PROCEDURE — 99284 EMERGENCY DEPT VISIT MOD MDM: CPT

## 2025-05-24 PROCEDURE — 99284 EMERGENCY DEPT VISIT MOD MDM: CPT | Mod: 25

## 2025-05-24 PROCEDURE — 85025 COMPLETE CBC W/AUTO DIFF WBC: CPT

## 2025-05-24 PROCEDURE — 86901 BLOOD TYPING SEROLOGIC RH(D): CPT

## 2025-05-24 PROCEDURE — 36415 COLL VENOUS BLD VENIPUNCTURE: CPT

## 2025-05-24 RX ORDER — ACETAMINOPHEN 500 MG/5ML
650 LIQUID (ML) ORAL ONCE
Refills: 0 | Status: COMPLETED | OUTPATIENT
Start: 2025-05-24 | End: 2025-05-24

## 2025-05-24 RX ADMIN — Medication 650 MILLIGRAM(S): at 20:20

## 2025-05-24 NOTE — ED PROVIDER NOTE - PATIENT PORTAL LINK FT
You can access the FollowMyHealth Patient Portal offered by [facility] by registering at the following website: [email or web address]. By joining [employer]’s FollowMyHealth portal, you will also be able to view your health information using other applications (apps) compatible with our system.

## 2025-05-24 NOTE — ED ADULT NURSE NOTE - OBJECTIVE STATEMENT
Patient presents hermann the ED from [facility]. As per EMS, patient hemoglobin is 7.2 at the facility. Patient alert to self and place. Patient does not know why she is here, when asked reports that her left foot hurts. Denies any injury to the foot. Aide at beside. Patient denies any bleeding or vomiting blood.

## 2025-05-24 NOTE — ED ADULT TRIAGE NOTE - CHIEF COMPLAINT QUOTE
BIBEMS from [facility] for low hemoglobin of 7.2. hx of afib on eliquis. denies vomiting, diarrhea, bloody stool, cp, sob.

## 2025-05-24 NOTE — ED PROVIDER NOTE - OBJECTIVE STATEMENT
80F DNR/DNI with a PMHx of CVA with L hemiplegia and hemiparesis, contracted extremities, bedbound, PVD, HLD, HTN, afib on apixaban, who was sent in from [facility] rehab for anemia to 7.2 on labs. Per patient and aid at bedside she has been having normal, brown, nonbloody BMs, no vomits, no bleeding elsewhere, no cp/SOB, no f/c, no abd pain, no urinary complaints., no dizziness or syncope. No trauma or fall.

## 2025-05-24 NOTE — ED ADULT TRIAGE NOTE - ESI TRIAGE ACUITY LEVEL, MLM
BS checked in error. Patient's . Notified Dr. Ulysses Kugel. Orders received for sliding scale insulin. 3

## 2025-05-24 NOTE — ED PROVIDER NOTE - NSFOLLOWUPINSTRUCTIONS_ED_ALL_ED_FT
1. You were seen for anemia. You hgb was 8.3. no indication for blood transfusion. Please follow up with your PCP for continued monitoring or your anemia. A copy of any of your resulted labs, imaging, and findings have been provided to you. Make sure to view any test results that may not have yet resulted at the time of your discharge by creating a FollowMyHealth account at: [email or web address] to sign up for FollowMyHealth.   2. Continue to take your home medications as prescribed.   3. Please follow up with your primary care physician. You may call our referrals coordinator at [phone] Monday to Friday 11am-7pm for assistance with making an appointment. Or you can call [phone] to make an appointment.  4. Return to the emergency department for new, persistent, or worsening symptoms or signs, or for any concerning symptoms.   5. For your for health, you should make healthy food choices and be physically active. Also, you should not smoke or use drugs, and you should not drink alcohol in excess. Please visit [email or web address] for resources and more information.    Anemia    Anemia is a condition in which the concentration of red blood cells or hemoglobin in the blood is below normal. Hemoglobin is a substance in red blood cells that carries oxygen to the tissues of the body. Anemia results in not enough oxygen reaching these tissues which can cause symptoms such as weakness, dizziness/lightheadedness, shortness of breath, chest pain, paleness, or nausea. The cause of your anemia may or may not be determined immediately. If your hemoglobin was dangerously low, you may have received a blood transfusion. Usually reactions to transfusions occur immediately but monitor yourself for any fevers, rash, or shortness of breath.    SEEK IMMEDIATE MEDICAL CARE IF YOU HAVE ANY OF THE FOLLOWING SYMPTOMS: extreme weakness/chest pain/shortness of breath, black or bloody stools, vomiting blood, fainting, fever, or any signs of dehydration.

## 2025-05-24 NOTE — ED PROVIDER NOTE - CLINICAL SUMMARY MEDICAL DECISION MAKING FREE TEXT BOX
80F DNR/DNI with a PMHx of CVA with L hemiplegia and hemiparesis, contracted extremities, bedbound, PVD, HLD, HTN, afib on apixaban, who was sent in from [facility] rehab for anemia to 7.2 on labs. Per patient and aid at bedside she has been having normal, brown, nonbloody BMs, no vomits, no bleeding elsewhere, no cp/SOB, no f/c, no abd pain, no urinary complaints., no dizziness or syncope. No trauma or fall.  VSS, pt bedbound with contracted ext from previous stroke, no resp distress, nontoxic appearing, abd soft, no bleeding observed.   Labs checked and hgb 8.3. No indication for PRBCs. Pt stable for DC with continued outpt workup of anemia.   Stable for DC back to NH, return precautions provided.

## 2025-05-24 NOTE — ED ADULT NURSE NOTE - NSFALLHARMRISKINTERV_ED_ALL_ED
Assistance OOB with selected safe patient handling equipment if applicable/Assistance with ambulation/Communicate risk of Fall with Harm to all staff, patient, and family/Monitor gait and stability/Provide visual cue: red socks, yellow wristband, yellow gown, etc/Reinforce activity limits and safety measures with patient and family/Bed in lowest position, wheels locked, appropriate side rails in place/Call bell, personal items and telephone in reach/Instruct patient to call for assistance before getting out of bed/chair/stretcher/Non-slip footwear applied when patient is off stretcher/[place] to call system/Physically safe environment - no spills, clutter or unnecessary equipment/Purposeful Proactive Rounding/Room/bathroom lighting operational, light cord in reach

## 2025-05-24 NOTE — ED PROVIDER NOTE - PHYSICAL EXAMINATION
GEN: Elderly, thin,  well developed, awake, alert, oriented to person, place, time/situation and in no apparent distress. NTAF  ENT: Airway patent, Nasal mucosa clear. Mouth with normal mucosa.  EYES: Clear bilaterally. PERRL, EOMI  RESPIRATORY: Breathing comfortably with normal RR.  no hypoxia or resp distress.  CARDIAC: Regular rate and rhythm, ANDREAS  ABDOMEN: Soft, nontender, +bowel sounds, no rebound, rigidity, or guarding.  MSK: L hemiplegia, hemiparesis, contracted extremities, distal ext warm and well-perfused.   NEURO: Alert and oriented, L hemiplegia, hemiparesis,   SKIN: Skin normal color for race, warm, dry and intact. No evidence of rash.  PSYCH: Alert and oriented to person, place, time/situation. normal mood and affect. no apparent risk to self or others.

## 2025-05-24 NOTE — ED ADULT NURSE NOTE - CHIEF COMPLAINT QUOTE
BIBEMS from [facility] for low hemoglobin of 7.2. hx of afib on eliquis. denies vomiting, diarrhea, bloody stool, cp, sob.

## 2025-05-25 PROBLEM — Z78.9 OTHER SPECIFIED HEALTH STATUS: Chronic | Status: ACTIVE | Noted: 2020-07-29

## 2025-05-26 DIAGNOSIS — I69.354 HEMIPLEGIA AND HEMIPARESIS FOLLOWING CEREBRAL INFARCTION AFFECTING LEFT NON-DOMINANT SIDE: ICD-10-CM

## 2025-05-26 DIAGNOSIS — D64.9 ANEMIA, UNSPECIFIED: ICD-10-CM

## 2025-05-26 DIAGNOSIS — Z79.01 LONG TERM (CURRENT) USE OF ANTICOAGULANTS: ICD-10-CM

## 2025-05-26 DIAGNOSIS — Z66 DO NOT RESUSCITATE: ICD-10-CM

## 2025-05-26 DIAGNOSIS — I48.91 UNSPECIFIED ATRIAL FIBRILLATION: ICD-10-CM

## 2025-05-26 DIAGNOSIS — I10 ESSENTIAL (PRIMARY) HYPERTENSION: ICD-10-CM

## 2025-05-26 DIAGNOSIS — I73.9 PERIPHERAL VASCULAR DISEASE, UNSPECIFIED: ICD-10-CM

## 2025-05-29 ENCOUNTER — INPATIENT (INPATIENT)
Facility: HOSPITAL | Age: 80
LOS: 6 days | Discharge: NURSING, REHAB OR LONG-TERM CARE | End: 2025-06-05
Attending: SURGERY | Admitting: SURGERY
Payer: MEDICAID

## 2025-05-29 VITALS
OXYGEN SATURATION: 98 % | RESPIRATION RATE: 17 BRPM | HEIGHT: 66 IN | TEMPERATURE: 98 F | WEIGHT: 113.54 LBS | HEART RATE: 172 BPM

## 2025-05-29 LAB
ALBUMIN SERPL ELPH-MCNC: 2.5 G/DL — LOW (ref 3.3–5)
ALP SERPL-CCNC: 178 U/L — HIGH (ref 40–120)
ALT FLD-CCNC: 11 U/L — SIGNIFICANT CHANGE UP (ref 10–45)
ANION GAP SERPL CALC-SCNC: 11 MMOL/L — SIGNIFICANT CHANGE UP (ref 5–17)
APPEARANCE UR: ABNORMAL
APTT BLD: 38.4 SEC — HIGH (ref 26.1–36.8)
AST SERPL-CCNC: 24 U/L — SIGNIFICANT CHANGE UP (ref 10–40)
BASOPHILS # BLD AUTO: 0.04 K/UL — SIGNIFICANT CHANGE UP (ref 0–0.2)
BASOPHILS NFR BLD AUTO: 0.3 % — SIGNIFICANT CHANGE UP (ref 0–2)
BILIRUB DIRECT SERPL-MCNC: 0.2 MG/DL — SIGNIFICANT CHANGE UP (ref 0–0.3)
BILIRUB INDIRECT FLD-MCNC: 0.3 MG/DL — SIGNIFICANT CHANGE UP (ref 0.2–1)
BILIRUB SERPL-MCNC: 0.4 MG/DL — SIGNIFICANT CHANGE UP (ref 0.2–1.2)
BILIRUB UR-MCNC: NEGATIVE — SIGNIFICANT CHANGE UP
BLD GP AB SCN SERPL QL: NEGATIVE — SIGNIFICANT CHANGE UP
BUN SERPL-MCNC: 16 MG/DL — SIGNIFICANT CHANGE UP (ref 7–23)
CALCIUM SERPL-MCNC: 8.6 MG/DL — SIGNIFICANT CHANGE UP (ref 8.4–10.5)
CHLORIDE SERPL-SCNC: 105 MMOL/L — SIGNIFICANT CHANGE UP (ref 96–108)
CO2 SERPL-SCNC: 26 MMOL/L — SIGNIFICANT CHANGE UP (ref 22–31)
COLOR SPEC: YELLOW — SIGNIFICANT CHANGE UP
CREAT SERPL-MCNC: 0.54 MG/DL — SIGNIFICANT CHANGE UP (ref 0.5–1.3)
DIFF PNL FLD: ABNORMAL
EGFR: 92 ML/MIN/1.73M2 — SIGNIFICANT CHANGE UP
EGFR: 92 ML/MIN/1.73M2 — SIGNIFICANT CHANGE UP
EOSINOPHIL # BLD AUTO: 0.01 K/UL — SIGNIFICANT CHANGE UP (ref 0–0.5)
EOSINOPHIL NFR BLD AUTO: 0.1 % — SIGNIFICANT CHANGE UP (ref 0–6)
FLUAV AG NPH QL: SIGNIFICANT CHANGE UP
FLUBV AG NPH QL: SIGNIFICANT CHANGE UP
GLUCOSE SERPL-MCNC: 116 MG/DL — HIGH (ref 70–99)
GLUCOSE UR QL: NEGATIVE MG/DL — SIGNIFICANT CHANGE UP
HCT VFR BLD CALC: 32.6 % — LOW (ref 34.5–45)
HGB BLD-MCNC: 10 G/DL — LOW (ref 11.5–15.5)
IMM GRANULOCYTES NFR BLD AUTO: 0.5 % — SIGNIFICANT CHANGE UP (ref 0–0.9)
INR BLD: 1.34 — HIGH (ref 0.85–1.16)
KETONES UR QL: ABNORMAL MG/DL
LACTATE SERPL-SCNC: 1.9 MMOL/L — SIGNIFICANT CHANGE UP (ref 0.5–2)
LACTATE SERPL-SCNC: 2.3 MMOL/L — HIGH (ref 0.5–2)
LACTATE SERPL-SCNC: 2.9 MMOL/L — HIGH (ref 0.5–2)
LEUKOCYTE ESTERASE UR-ACNC: ABNORMAL
LIDOCAIN IGE QN: 9 U/L — SIGNIFICANT CHANGE UP (ref 7–60)
LYMPHOCYTES # BLD AUTO: 19.4 % — SIGNIFICANT CHANGE UP (ref 13–44)
LYMPHOCYTES # BLD AUTO: 2.9 K/UL — SIGNIFICANT CHANGE UP (ref 1–3.3)
MAGNESIUM SERPL-MCNC: 1.8 MG/DL — SIGNIFICANT CHANGE UP (ref 1.6–2.6)
MCHC RBC-ENTMCNC: 28.4 PG — SIGNIFICANT CHANGE UP (ref 27–34)
MCHC RBC-ENTMCNC: 30.7 G/DL — LOW (ref 32–36)
MCV RBC AUTO: 92.6 FL — SIGNIFICANT CHANGE UP (ref 80–100)
MONOCYTES # BLD AUTO: 0.62 K/UL — SIGNIFICANT CHANGE UP (ref 0–0.9)
MONOCYTES NFR BLD AUTO: 4.1 % — SIGNIFICANT CHANGE UP (ref 2–14)
NEUTROPHILS # BLD AUTO: 11.3 K/UL — HIGH (ref 1.8–7.4)
NEUTROPHILS NFR BLD AUTO: 75.6 % — SIGNIFICANT CHANGE UP (ref 43–77)
NITRITE UR-MCNC: POSITIVE
NRBC BLD AUTO-RTO: 0 /100 WBCS — SIGNIFICANT CHANGE UP (ref 0–0)
PH UR: 6 — SIGNIFICANT CHANGE UP (ref 5–8)
PLATELET # BLD AUTO: 256 K/UL — SIGNIFICANT CHANGE UP (ref 150–400)
POTASSIUM SERPL-MCNC: 4 MMOL/L — SIGNIFICANT CHANGE UP (ref 3.5–5.3)
POTASSIUM SERPL-SCNC: 4 MMOL/L — SIGNIFICANT CHANGE UP (ref 3.5–5.3)
PROT SERPL-MCNC: 6.8 G/DL — SIGNIFICANT CHANGE UP (ref 6–8.3)
PROT UR-MCNC: 30 MG/DL
PROTHROM AB SERPL-ACNC: 15.4 SEC — HIGH (ref 9.9–13.4)
RBC # BLD: 3.52 M/UL — LOW (ref 3.8–5.2)
RBC # FLD: 17.3 % — HIGH (ref 10.3–14.5)
RH IG SCN BLD-IMP: POSITIVE — SIGNIFICANT CHANGE UP
RSV RNA NPH QL NAA+NON-PROBE: SIGNIFICANT CHANGE UP
SARS-COV-2 RNA SPEC QL NAA+PROBE: SIGNIFICANT CHANGE UP
SODIUM SERPL-SCNC: 142 MMOL/L — SIGNIFICANT CHANGE UP (ref 135–145)
SOURCE RESPIRATORY: SIGNIFICANT CHANGE UP
SP GR SPEC: >1.03 — HIGH (ref 1–1.03)
UROBILINOGEN FLD QL: 1 MG/DL — SIGNIFICANT CHANGE UP (ref 0.2–1)
WBC # BLD: 14.94 K/UL — HIGH (ref 3.8–10.5)
WBC # FLD AUTO: 14.94 K/UL — HIGH (ref 3.8–10.5)

## 2025-05-29 PROCEDURE — 49320 DIAG LAPARO SEPARATE PROC: CPT | Mod: GC,59

## 2025-05-29 PROCEDURE — 71045 X-RAY EXAM CHEST 1 VIEW: CPT | Mod: 26

## 2025-05-29 PROCEDURE — 74177 CT ABD & PELVIS W/CONTRAST: CPT | Mod: 26

## 2025-05-29 PROCEDURE — 49550 RPR REM HERNIA INIT REDUCE: CPT | Mod: GC,LT

## 2025-05-29 PROCEDURE — 71045 X-RAY EXAM CHEST 1 VIEW: CPT | Mod: 26,77

## 2025-05-29 PROCEDURE — 99291 CRITICAL CARE FIRST HOUR: CPT

## 2025-05-29 DEVICE — MESH HERNIA PERFIX PLUG EXTRA LARGE 1.6 X 2": Type: IMPLANTABLE DEVICE | Status: FUNCTIONAL

## 2025-05-29 DEVICE — CLIP APPLIER ETHICON LIGACLIP 9 3/8" SMALL: Type: IMPLANTABLE DEVICE | Status: FUNCTIONAL

## 2025-05-29 DEVICE — CLIP APPLIER ETHICON LIGACLIP 11.5" MEDIUM: Type: IMPLANTABLE DEVICE | Status: FUNCTIONAL

## 2025-05-29 DEVICE — STAPLER COVIDIEN TRI-STAPLE 60MM PURPLE RELOAD: Type: IMPLANTABLE DEVICE | Status: FUNCTIONAL

## 2025-05-29 DEVICE — STAPLER COVIDIEN TRI-STAPLE 45MM PURPLE RELOAD: Type: IMPLANTABLE DEVICE | Status: FUNCTIONAL

## 2025-05-29 RX ORDER — SODIUM CHLORIDE 9 G/1000ML
500 INJECTION, SOLUTION INTRAVENOUS ONCE
Refills: 0 | Status: COMPLETED | OUTPATIENT
Start: 2025-05-29 | End: 2025-05-29

## 2025-05-29 RX ORDER — ACETAMINOPHEN 500 MG/5ML
1000 LIQUID (ML) ORAL ONCE
Refills: 0 | Status: COMPLETED | OUTPATIENT
Start: 2025-05-29 | End: 2025-05-29

## 2025-05-29 RX ORDER — PIPERACILLIN-TAZO-DEXTROSE,ISO 3.375G/5
3.38 IV SOLUTION, PIGGYBACK PREMIX FROZEN(ML) INTRAVENOUS ONCE
Refills: 0 | Status: COMPLETED | OUTPATIENT
Start: 2025-05-29 | End: 2025-05-29

## 2025-05-29 RX ORDER — ONDANSETRON HCL/PF 4 MG/2 ML
4 VIAL (ML) INJECTION ONCE
Refills: 0 | Status: COMPLETED | OUTPATIENT
Start: 2025-05-29 | End: 2025-05-29

## 2025-05-29 RX ORDER — HEPARIN SODIUM 1000 [USP'U]/ML
5000 INJECTION INTRAVENOUS; SUBCUTANEOUS EVERY 8 HOURS
Refills: 0 | Status: DISCONTINUED | OUTPATIENT
Start: 2025-05-30 | End: 2025-05-30

## 2025-05-29 RX ORDER — CEFTRIAXONE 500 MG/1
2000 INJECTION, POWDER, FOR SOLUTION INTRAMUSCULAR; INTRAVENOUS EVERY 24 HOURS
Refills: 0 | Status: DISCONTINUED | OUTPATIENT
Start: 2025-05-29 | End: 2025-05-29

## 2025-05-29 RX ORDER — ONDANSETRON HCL/PF 4 MG/2 ML
4 VIAL (ML) INJECTION EVERY 6 HOURS
Refills: 0 | Status: DISCONTINUED | OUTPATIENT
Start: 2025-05-29 | End: 2025-05-29

## 2025-05-29 RX ORDER — ONDANSETRON HCL/PF 4 MG/2 ML
4 VIAL (ML) INJECTION EVERY 6 HOURS
Refills: 0 | Status: DISCONTINUED | OUTPATIENT
Start: 2025-05-29 | End: 2025-06-05

## 2025-05-29 RX ORDER — SODIUM CHLORIDE 9 G/1000ML
1000 INJECTION, SOLUTION INTRAVENOUS
Refills: 0 | Status: DISCONTINUED | OUTPATIENT
Start: 2025-05-29 | End: 2025-05-30

## 2025-05-29 RX ORDER — CEFTRIAXONE 500 MG/1
2000 INJECTION, POWDER, FOR SOLUTION INTRAMUSCULAR; INTRAVENOUS EVERY 24 HOURS
Refills: 0 | Status: COMPLETED | OUTPATIENT
Start: 2025-05-30 | End: 2025-05-31

## 2025-05-29 RX ORDER — ACETAMINOPHEN 500 MG/5ML
1000 LIQUID (ML) ORAL ONCE
Refills: 0 | Status: DISCONTINUED | OUTPATIENT
Start: 2025-05-29 | End: 2025-05-29

## 2025-05-29 RX ORDER — HYDROMORPHONE/SOD CHLOR,ISO/PF 2 MG/10 ML
0.5 SYRINGE (ML) INJECTION EVERY 6 HOURS
Refills: 0 | Status: DISCONTINUED | OUTPATIENT
Start: 2025-05-29 | End: 2025-06-01

## 2025-05-29 RX ORDER — EZETIMIBE 10 MG/1
1 TABLET ORAL
Refills: 0 | DISCHARGE

## 2025-05-29 RX ORDER — ATORVASTATIN CALCIUM 80 MG/1
1 TABLET, FILM COATED ORAL
Refills: 0 | DISCHARGE

## 2025-05-29 RX ORDER — SODIUM CHLORIDE 9 G/1000ML
1000 INJECTION, SOLUTION INTRAVENOUS
Refills: 0 | Status: DISCONTINUED | OUTPATIENT
Start: 2025-05-29 | End: 2025-05-29

## 2025-05-29 RX ORDER — VANCOMYCIN HCL IN 5 % DEXTROSE 1.5G/250ML
1000 PLASTIC BAG, INJECTION (ML) INTRAVENOUS ONCE
Refills: 0 | Status: COMPLETED | OUTPATIENT
Start: 2025-05-29 | End: 2025-05-29

## 2025-05-29 RX ORDER — MIRTAZAPINE 30 MG/1
1 TABLET, FILM COATED ORAL
Refills: 0 | DISCHARGE

## 2025-05-29 RX ORDER — MELATONIN 5 MG
1 TABLET ORAL
Refills: 0 | DISCHARGE

## 2025-05-29 RX ADMIN — SODIUM CHLORIDE 90 MILLILITER(S): 9 INJECTION, SOLUTION INTRAVENOUS at 14:48

## 2025-05-29 RX ADMIN — Medication 400 MILLIGRAM(S): at 20:00

## 2025-05-29 RX ADMIN — CEFTRIAXONE 100 MILLIGRAM(S): 500 INJECTION, POWDER, FOR SOLUTION INTRAMUSCULAR; INTRAVENOUS at 14:46

## 2025-05-29 RX ADMIN — Medication 1000 MILLIGRAM(S): at 10:12

## 2025-05-29 RX ADMIN — Medication 1000 MILLILITER(S): at 14:00

## 2025-05-29 RX ADMIN — Medication 3.38 GRAM(S): at 11:12

## 2025-05-29 RX ADMIN — Medication 400 MILLIGRAM(S): at 09:42

## 2025-05-29 RX ADMIN — Medication 200 GRAM(S): at 10:08

## 2025-05-29 RX ADMIN — Medication 1850 MILLILITER(S): at 09:41

## 2025-05-29 RX ADMIN — Medication 250 MILLIGRAM(S): at 12:04

## 2025-05-29 RX ADMIN — Medication 1000 MILLIGRAM(S): at 10:11

## 2025-05-29 RX ADMIN — Medication 1850 MILLILITER(S): at 14:36

## 2025-05-29 RX ADMIN — Medication 1000 MILLIGRAM(S): at 13:13

## 2025-05-29 RX ADMIN — Medication 40 MILLIGRAM(S): at 20:00

## 2025-05-29 RX ADMIN — Medication 4 MILLIGRAM(S): at 09:42

## 2025-05-29 RX ADMIN — SODIUM CHLORIDE 250 MILLILITER(S): 9 INJECTION, SOLUTION INTRAVENOUS at 20:15

## 2025-05-29 NOTE — ED PROVIDER NOTE - PHYSICAL EXAMINATION
general: Chronically ill appearing, in no acute distress  HEENT: Normocephalic, atraumatic, extraocular movements intact  CV: Irregular, tachycardic  Pulm: No respiratory distress, no tachypnea  Abd: soft, diffuse mild ttp, no r/g  Ext: warm and well perfused  Skin: No gross rashes or lesions  Neuro: Alert and oriented to person and place, not year

## 2025-05-29 NOTE — ED PROVIDER NOTE - OBJECTIVE STATEMENT
80 year old F with history of CVA with L hemiplegia and hemiparesis, contracted extremities, bedbound, PVD, HLD, HTN, Afib on Eliquis, DNR/DNI presenting with nausea, vomiting, nbnb X 1 day. Was seen here on 5/24, had hgb 8.3, sent back to [facility]. Vomit billious in nature with abd pain. Denies melena or brbpr. No fever, cp, sob, urinary complaints. ROS as above.

## 2025-05-29 NOTE — ED PROVIDER NOTE - NS ED ROS FT
Constitutional: No fever or chills  Eyes: No discharge or drainage  Ears, Nose, Mouth, Throat: No nasal discharge, no sore throat  Cardiovascular: No chest pain, no palpitations  Respiratory: No shortness of breath, no cough  Gastrointestinal: +nausea or vomiting, +abdominal pain, no diarrhea or constipation  Musculoskeletal: No joint pain, no swelling  Skin: No rashes or lesions  Neurological: No numbness, weakness, tingling, no headache  Psychiatric: No depression

## 2025-05-29 NOTE — ED ADULT NURSE NOTE - NSFALLRISKINTERV_ED_ALL_ED

## 2025-05-29 NOTE — ED ADULT NURSE NOTE - CHIEF COMPLAINT QUOTE
Pt [facility]EMS from [facility] for 2 episodes of bloody emesis. Pt tachycardic to 170s on arrival to [facility], unable to get bp. Pt UG to [facility], placed on CCM. EKG in progress. PMH HTN, GERD, CVA, a fib + eliquis.

## 2025-05-29 NOTE — PRE-ANESTHESIA EVALUATION ADULT - NSANTHADDINFOFT_GEN_ALL_CORE
Discussed the plan for general anesthesia and explained associated risks and benefits - including risk of cardiopulmonary compromise, eye injury, sore throat, airway injury, postoperative nausea and vomiting, and positioning or nerve injury. Patient safety was emphasized, all questions were answered, and the patient proxy elects to proceed. Discussed the plan for general anesthesia and explained associated risks and benefits - including risk of cardiopulmonary compromise, eye injury, sore throat, airway injury, postoperative nausea and vomiting, and positioning or nerve injury. Patient safety was emphasized, all questions were answered, and the patient proxy elects to proceed.  spoke to adams Tello

## 2025-05-29 NOTE — ED PROVIDER NOTE - CRITICAL CARE ATTENDING CONTRIBUTION TO CARE
upon my evaluation, this patient had a high probability of imminent or life-threatening deterioration due to atrial fibrillation with rapid ventricular response which required my direct attention, intervention, and personal management.    I have personally provided 60 minutes of critical care time exclusive of time spent on separately billable procedures. Time includes review of laboratory data, radiology results, discussion with consultants, and monitoring for potential decompensation. Interventions were performed as documented above.

## 2025-05-29 NOTE — H&P ADULT - NSHPLABSRESULTS_GEN_ALL_CORE
CT Abdomen and Pelvis w/ IV Cont:   ACC: [id number] EXAM:  CT ABDOMEN AND PELVIS IC   ORDERED BY: DHAVAL WORRELL     PROCEDURE DATE:  05/29/2025          INTERPRETATION:  CLINICAL INFORMATION: Nausea and vomiting. 80-year-old   female.    COMPARISON: None.    CONTRAST/COMPLICATIONS:  IV Contrast: Isovue 370  90 cc administered   10 cc discarded  Oral Contrast: None  .    PROCEDURE:  CT of the Abdomen and Pelvis was performed.  Sagittal and coronal reformats were performed.    FINDINGS: Patient's arms are down by her sides in CT gantry.      LOWER CHEST: Bilateral breast dense fibroglandular tissues. Likely   bilateral free silicone breast injections. Trace left posterior basal   pleural effusion. Dense mitral valve annular calcification. Aortic valve   calcification. Asymmetric thickening of the septal wall of the left   ventricle as compared to the lateral wall. Small hiatal hernia.    LIVER: Nonenlarged. 2.7 cm hypodensity in segment eight of liver. Suspect   two other hypodensities including 1.1 cm segment seven and 1.2 cm segment   eight.  BILE DUCTS: Normal caliber.  GALLBLADDER: Within normal limits.  SPLEEN: Within normal limits.  PANCREAS: Within normal limits.  ADRENALS: Within normal limits.  KIDNEYS/URETERS: Few bilateral cortical cysts largest measures 2.5 cm   upper pole right kidney.    BLADDER: Within normal limits.  REPRODUCTIVE ORGANS: Unremarkable uterus. Atrophic ovaries.    BOWEL: Evidence of dilated small bowel up to at least 3.1 cm diameter due   to an incarcerated loop of ileum within a left femoral hernia. No bowel   pneumatosis. No mesenteric edema. No mesenteric or portal venous air.   Wall thickening of distal ileal loops contiguous with the left femoral   hernia. Colonic diverticulosis. Colon loaded with fecal material. Appears   to be an approximately 7 cm long segment of wall thickening of the distal   cecum, proximal ascending colon. Differential diagnosis would include   colonic adenocarcinoma or less likely colitis.  PERITONEUM/RETROPERITONEUM: Within small amount of pelvic ascites.  VESSELS: Within normal limits.  LYMPH NODES: No lymphadenopathy.  ABDOMINAL WALL: Within normal limits.  BONES: Diffuse decrease in bony mineralization. Right hip total   arthroplasty. Grade 1 degenerative anterolisthesis of L4 on L5.   Degenerative disc disease L4-5. Wedge compression of superior endplate of   L2 and superior endplate of L3 vertebral bodies.    IMPRESSION:  Distal small bowel obstruction due to an incarcerated left femoral hernia.    Three right lobe liver lesions. Differential diagnosis would include   metastases.    Abnormal segment of wall thickening of the proximal ascending colon.   Findings can be correlated with colonoscopy.    Findings called in to the ER  Kayleen  at 12:14 PM 5/29/2025    --- End of Report ---            ADDISON LOPEZ MD; Attending Radiologist  This document has been electronically signed. May 29 2025 12:16PM (05-29-25 @ 11:36)

## 2025-05-29 NOTE — PRE-ANESTHESIA EVALUATION ADULT - NSANTHOSAYNRD_GEN_A_CORE
No. URMILA screening performed.  STOP BANG Legend: 0-2 = LOW Risk; 3-4 = INTERMEDIATE Risk; 5-8 = HIGH Risk

## 2025-05-29 NOTE — ED ADULT NURSE REASSESSMENT NOTE - NS ED NURSE REASSESS COMMENT FT1
Nasogastric tube placed by surgery, assistance provided, pt tolerated procedure well. 200 cc of dark colored stomach content  collected in canister. HHA at bedside, VS remained stable. ongoing cardiac monitoring.

## 2025-05-29 NOTE — PROGRESS NOTE ADULT - SUBJECTIVE AND OBJECTIVE BOX
General Surgery Post-Op Note    Procedure: open left femoral hernia repair with mesh, dx laparoscopy w/ healthy bowel     Diagnosis/Indication: incarcerated L femoral hernia    Surgeon: Dr. Herron    S: Pt seen and examined at the bedside. Interview limited d/t baseline mental status. Pt has no complaints. Pain controlled with medication. UOP 20cc in last hour.     O:  T(C): 36.5 (05-29-25 @ 18:20), Max: 36.5 (05-29-25 @ 18:20)  T(F): 97.7 (05-29-25 @ 18:20), Max: 97.7 (05-29-25 @ 18:20)  HR: 84 (05-29-25 @ 19:50) (79 - 96)  BP: 103/57 (05-29-25 @ 19:50) (94/52 - 119/58)  RR: 19 (05-29-25 @ 19:50) (14 - 25)  SpO2: 100% (05-29-25 @ 19:50) (100% - 100%)  Wt(kg): --                        10.0   14.94 )-----------( 256      ( 29 May 2025 09:22 )             32.6     05-29    142  |  105  |  16  ----------------------------<  116[H]  4.0   |  26  |  0.54    Ca    8.6      29 May 2025 09:22  Mg     1.8     05-29    TPro  6.8  /  Alb  2.5[L]  /  TBili  0.4  /  DBili  0.2  /  AST  24  /  ALT  11  /  AlkPhos  178[H]  05-29      Gen: NAD, resting comfortably in bed  C/V: NSR  Pulm: Nonlabored breathing, no respiratory distress  Abd: soft, ND, mild TTP near umbilicus           Incisions: C/D/I  Extrem: WWP, no calf edema, SCDs in place      A/P: 80yFemale s/p open left femoral hernia repair with mesh, dx laparoscopy w/ healthy bowel. Doing well postoperatively, appears to be at baseline status. Borderline UOP in last hour, will gently give additional fluids given cardiac hx.  NPO/IVF  CTX (5/29- ) for +UA  Pain/Nausea PRN  NGT to LCWS, PPI  Bedbound/IS/SCDS/SQH  [place] in place from nursing home  [facility] General Surgery Post-Op Note    Procedure: open left femoral hernia repair with mesh, dx laparoscopy w/ healthy bowel     Diagnosis/Indication: incarcerated L femoral hernia    Surgeon: Dr. Herron    S: Pt seen and examined at the bedside. Interview limited d/t baseline mental status. Pt has no complaints. Pain controlled with medication. UOP 20cc in last hour.     O:  T(C): 36.5 (05-29-25 @ 18:20), Max: 36.5 (05-29-25 @ 18:20)  T(F): 97.7 (05-29-25 @ 18:20), Max: 97.7 (05-29-25 @ 18:20)  HR: 84 (05-29-25 @ 19:50) (79 - 96)  BP: 103/57 (05-29-25 @ 19:50) (94/52 - 119/58)  RR: 19 (05-29-25 @ 19:50) (14 - 25)  SpO2: 100% (05-29-25 @ 19:50) (100% - 100%)  Wt(kg): --                        10.0   14.94 )-----------( 256      ( 29 May 2025 09:22 )             32.6     05-29    142  |  105  |  16  ----------------------------<  116[H]  4.0   |  26  |  0.54    Ca    8.6      29 May 2025 09:22  Mg     1.8     05-29    TPro  6.8  /  Alb  2.5[L]  /  TBili  0.4  /  DBili  0.2  /  AST  24  /  ALT  11  /  AlkPhos  178[H]  05-29      Gen: NAD, resting comfortably in bed  C/V: NSR  Pulm: Nonlabored breathing, no respiratory distress  Abd: soft, ND, mild TTP near umbilicus           Incisions: C/D/I  NG Tube: low continuous wall suction, draining dark brown gastric contents  Extrem: WWP, no calf edema, SCDs in place  : tompkins in place draining clear yellow, appears somewhat concentrated      A/P: 80yFemale s/p open left femoral hernia repair with mesh, dx laparoscopy w/ healthy bowel. Doing well postoperatively, appears to be at baseline status. Borderline UOP in last hour, will gently give additional fluids given cardiac hx.  NPO/IVF  CTX (5/29- ) for +UA  Pain/Nausea PRN  NGT to LCWS, PPI  Bedbound/IS/SCDS/SQH  [place] in place from nursing home  [facility]

## 2025-05-29 NOTE — ED ADULT NURSE NOTE - OBJECTIVE STATEMENT
80 y.o female, a&0 x3, ambulates with a walker at home, HHA at bedside. PMH of afib pt on eliquis. C/o vomiting x2 days. Pt denies pain, injury, dysuria, urinary frequency, fevers, chills, dizziness, headache. Pt speaking in complete sentences, airway intact, no facial droop, NAD. 80 y.o female, a&0 x3, ambulates with a walker at home, HHA at bedside. PMH of afib pt on eliquis. C/o vomiting x2 days, pt vomited twice once in pt bed, color green. Pt denies pain, injury, dysuria, urinary frequency, fevers, chills, dizziness, headache. Pt speaking in complete sentences, airway intact, no facial droop, NAD. Pt denying pain but cries out when touched.

## 2025-05-29 NOTE — ED PROVIDER NOTE - CLINICAL SUMMARY MEDICAL DECISION MAKING FREE TEXT BOX
79 yo with n/v, tachycardic, afib with rvr, vomited X 2, billious, non bloody, brown stool on rectal exam, rectal temp of 99.7. Will do infectious workup, CT abd/Pel for ro intraabdominal pathology, reassess

## 2025-05-29 NOTE — H&P ADULT - ATTENDING COMMENTS
Agree with above. plan for OR. Increased risk for bleeding due to eliquis. Cannot assess fully due to her dementia. In OR will plan to try femoral approach, possible laparoscopy, possible ex-lap or open repair.

## 2025-05-29 NOTE — ED PROVIDER NOTE - WR ORDER NAME 1
Quality 130: Documentation Of Current Medications In The Medical Record: Current Medications Documented Detail Level: Detailed Quality 131: Pain Assessment And Follow-Up: No documentation of pain assessment, reason not given Quality 110: Preventive Care And Screening: Influenza Immunization: Influenza Immunization Administered during Influenza season Xray Chest 1 View-PORTABLE IMMEDIATE

## 2025-05-29 NOTE — BRIEF OPERATIVE NOTE - OPERATION/FINDINGS
Assumed patient care at 0730. Patient resting in bed, alert/oriented. High flow nasal cannula on 6L, denies SOB. Monitor shows sinus rhythm. Inconsistent bedside swallow evaluation, plan for video swallow on Monday. Pureed diet w/ nectar thick liquids, crush pills in applesauce, no signs of aspiration noted. Straight cath per protocol. Denies pain. Up to chair with sit to stand. Per MD keep in CCU tonight. Plan of care updated with patient and family. Procedure: Left femoral hernia repair w/ mesh, diagnostic laparoscopy  Indication: SBO 2/2 incarcerated L femoral hernia    Pubic symphysis and ASIS identified. Diagonal incision created overlying femoral canal. Dissection continued until reached hernia sac, which was cleared of adhesions. Sac was entered and fatty contents reduced. No bowel was noted at this time, likely reduced with induction of anesthesia. Hernia sac closed with 2-0 Vicryl in running fashion. Diagnostic laparoscopy performed via infraumbilical cutdown. Additional 5mm trocars placed x2 laterally. Bowel noted to be healthy and viable. Plug mesh inserted into femoral canal and secured with sutures superiorly and inferiorly. Skin closed in layers. Umbilical fascia closed with 0-0 Vicryl. Skin closed with monocryl. Patient tolerated procedure well.

## 2025-05-29 NOTE — H&P ADULT - HISTORY OF PRESENT ILLNESS
79yo Female pt with PMH dementia (AAOx1), CVA w/ LT hemiplegia, bedbound, PVD, HTN, HLD, afib (on Eliquis), sent in from [facility] nursing [place] reporting LT groin pain x24 hours, pt denies any N/V/D, fever, chills, CP, SOB, reports normal BM yx, unsure if passing flatus, last meal yesterday. History limited 2/2 patient dementia, some info per nursing home.    Pt bedbound, primary medical decision maker family friend Marcos     In the ED, pt afebrile, nontachycardic, normotensive, and satting on RA.     On exam, abd soft, nontender, nondistended, no appreciable hernia to B/L groins, no appreciable incisions.      Labs significant for WBC 14 w/ LT shift, hgb 10, LA 2.9 > 2.3, ,     CTAP showing evidence of dilated small bowel up to at least 3.1 cm 2/2  incarcerated loop of ileum within a left femoral hernia.     PMH: HTN, HLD, afib (on eliquis), PVD, CVA, wheelchair bound  PSHx: No known abd surgeries per HCP  Medications:   Allergies: NKDA  Social Hx: Lives in [facility] nursing [place]   Family Hx: Denies family hx of IBS, Crohn's, UC, or colon cancer.  Last colonoscopy: Unsure  Last EGD: NA    T(C): 37.6 (05-29-25 @ 09:18), Max: 37.6 (05-29-25 @ 09:18)  HR: 131 (05-29-25 @ 09:38) (131 - 172)  BP: 114/60 (05-29-25 @ 09:38) (114/60 - 130/69)  RR: 19 (05-29-25 @ 09:38) (17 - 19)  SpO2: 99% (05-29-25 @ 09:38) (98% - 99%)    Physical Exam  General: AAOx1, NAD, laying comfortably in bed  Cardio: S1,S2, No MRG  Pulm: Nonlabored breathing  Abdomen:  Extremities: WWP, peripheral pulses appreciated      LABS:                        10.0   14.94 )-----------( 256      ( 29 May 2025 09:22 )             32.6     05-29    142  |  105  |  16  ----------------------------<  116[H]  4.0   |  26  |  0.54    Ca    8.6      29 May 2025 09:22  Mg     1.8     05-29    TPro  6.8  /  Alb  2.5[L]  /  TBili  0.4  /  DBili  0.2  /  AST  24  /  ALT  11  /  AlkPhos  178[H]  05-29    PT/INR - ( 29 May 2025 09:22 )   PT: 15.4 sec;   INR: 1.34          PTT - ( 29 May 2025 09:22 )  PTT:38.4 sec

## 2025-05-29 NOTE — ED ADULT TRIAGE NOTE - CHIEF COMPLAINT QUOTE
[facility] for [facility] episodes of bloody emesis. Pt tachycardic to 170s on arrival to [facility], unable to get bp. Pt UG to [facility], placed on CCM. EKG in progress. PMH HTN, GERD, CVA, a fib- no AC use. [facility] 2 episodes of bloody emesis. Pt tachycardic to 170s on arrival to [facility], unable to get bp. Pt UG to [facility], placed on CCM. EKG in progress. PMH HTN, GERD, CVA, a fib + eliquis.

## 2025-05-29 NOTE — H&P ADULT - ASSESSMENT
80F w/ dementia, admitted for findings of SBO 2/2 incarcerated loop of ileum within a LT femoral hernia. Plan to admit for NGT decompression, added on to OR schedule for dx lap, possible SBR, possible ex lap/hernia repair.     Admit - Tele - [place]  NPO/IVF  Pain/Nausea PRN  NGT to LCWS  Bedbound/IS/SCD/Holding SQH pending OR  Pre-op/[facility]  Added on Class 2 dx lap, possible SBR, inguinal/femoral hernia, possible ex lap, possible open hernia repair

## 2025-05-29 NOTE — BRIEF OPERATIVE NOTE - NSICDXBRIEFPROCEDURE_GEN_ALL_CORE_FT
PROCEDURES:  Left femoral hernia repair 29-May-2025 18:27:07  Juan Manuel Cordon  Diagnostic laparoscopy 29-May-2025 18:27:16  Juan Manuel Cordon

## 2025-05-30 DIAGNOSIS — E46 UNSPECIFIED PROTEIN-CALORIE MALNUTRITION: ICD-10-CM

## 2025-05-30 DIAGNOSIS — Z51.5 ENCOUNTER FOR PALLIATIVE CARE: ICD-10-CM

## 2025-05-30 DIAGNOSIS — R53.81 OTHER MALAISE: ICD-10-CM

## 2025-05-30 DIAGNOSIS — Z71.89 OTHER SPECIFIED COUNSELING: ICD-10-CM

## 2025-05-30 DIAGNOSIS — C80.1 MALIGNANT (PRIMARY) NEOPLASM, UNSPECIFIED: ICD-10-CM

## 2025-05-30 LAB
ANION GAP SERPL CALC-SCNC: 10 MMOL/L — SIGNIFICANT CHANGE UP (ref 5–17)
BUN SERPL-MCNC: 12 MG/DL — SIGNIFICANT CHANGE UP (ref 7–23)
CALCIUM SERPL-MCNC: 8 MG/DL — LOW (ref 8.4–10.5)
CHLORIDE SERPL-SCNC: 108 MMOL/L — SIGNIFICANT CHANGE UP (ref 96–108)
CO2 SERPL-SCNC: 23 MMOL/L — SIGNIFICANT CHANGE UP (ref 22–31)
CREAT SERPL-MCNC: 0.56 MG/DL — SIGNIFICANT CHANGE UP (ref 0.5–1.3)
EGFR: 92 ML/MIN/1.73M2 — SIGNIFICANT CHANGE UP
EGFR: 92 ML/MIN/1.73M2 — SIGNIFICANT CHANGE UP
GLUCOSE SERPL-MCNC: 92 MG/DL — SIGNIFICANT CHANGE UP (ref 70–99)
HCT VFR BLD CALC: 20.5 % — CRITICAL LOW (ref 34.5–45)
HCT VFR BLD CALC: 22.7 % — LOW (ref 34.5–45)
HCT VFR BLD CALC: 24.8 % — LOW (ref 34.5–45)
HGB BLD-MCNC: 6.3 G/DL — CRITICAL LOW (ref 11.5–15.5)
HGB BLD-MCNC: 7.1 G/DL — LOW (ref 11.5–15.5)
HGB BLD-MCNC: 7.9 G/DL — LOW (ref 11.5–15.5)
LACTATE SERPL-SCNC: 1.2 MMOL/L — SIGNIFICANT CHANGE UP (ref 0.5–2)
MAGNESIUM SERPL-MCNC: 1.6 MG/DL — SIGNIFICANT CHANGE UP (ref 1.6–2.6)
MCHC RBC-ENTMCNC: 28.2 PG — SIGNIFICANT CHANGE UP (ref 27–34)
MCHC RBC-ENTMCNC: 28.6 PG — SIGNIFICANT CHANGE UP (ref 27–34)
MCHC RBC-ENTMCNC: 29.5 PG — SIGNIFICANT CHANGE UP (ref 27–34)
MCHC RBC-ENTMCNC: 30.7 G/DL — LOW (ref 32–36)
MCHC RBC-ENTMCNC: 31.3 G/DL — LOW (ref 32–36)
MCHC RBC-ENTMCNC: 31.9 G/DL — LOW (ref 32–36)
MCV RBC AUTO: 88.6 FL — SIGNIFICANT CHANGE UP (ref 80–100)
MCV RBC AUTO: 93.2 FL — SIGNIFICANT CHANGE UP (ref 80–100)
MCV RBC AUTO: 94.2 FL — SIGNIFICANT CHANGE UP (ref 80–100)
NRBC BLD AUTO-RTO: 0 /100 WBCS — SIGNIFICANT CHANGE UP (ref 0–0)
PHOSPHATE SERPL-MCNC: 3.7 MG/DL — SIGNIFICANT CHANGE UP (ref 2.5–4.5)
PLATELET # BLD AUTO: 197 K/UL — SIGNIFICANT CHANGE UP (ref 150–400)
PLATELET # BLD AUTO: 202 K/UL — SIGNIFICANT CHANGE UP (ref 150–400)
PLATELET # BLD AUTO: 203 K/UL — SIGNIFICANT CHANGE UP (ref 150–400)
POTASSIUM SERPL-MCNC: 4.3 MMOL/L — SIGNIFICANT CHANGE UP (ref 3.5–5.3)
POTASSIUM SERPL-SCNC: 4.3 MMOL/L — SIGNIFICANT CHANGE UP (ref 3.5–5.3)
RBC # BLD: 2.2 M/UL — LOW (ref 3.8–5.2)
RBC # BLD: 2.41 M/UL — LOW (ref 3.8–5.2)
RBC # BLD: 2.8 M/UL — LOW (ref 3.8–5.2)
RBC # FLD: 17 % — HIGH (ref 10.3–14.5)
RBC # FLD: 17.1 % — HIGH (ref 10.3–14.5)
RBC # FLD: 17.9 % — HIGH (ref 10.3–14.5)
SODIUM SERPL-SCNC: 141 MMOL/L — SIGNIFICANT CHANGE UP (ref 135–145)
WBC # BLD: 10.85 K/UL — HIGH (ref 3.8–10.5)
WBC # BLD: 11.81 K/UL — HIGH (ref 3.8–10.5)
WBC # BLD: 12.56 K/UL — HIGH (ref 3.8–10.5)
WBC # FLD AUTO: 10.85 K/UL — HIGH (ref 3.8–10.5)
WBC # FLD AUTO: 11.81 K/UL — HIGH (ref 3.8–10.5)
WBC # FLD AUTO: 12.56 K/UL — HIGH (ref 3.8–10.5)

## 2025-05-30 PROCEDURE — 99223 1ST HOSP IP/OBS HIGH 75: CPT

## 2025-05-30 PROCEDURE — 99222 1ST HOSP IP/OBS MODERATE 55: CPT

## 2025-05-30 PROCEDURE — 99497 ADVNCD CARE PLAN 30 MIN: CPT | Mod: 25

## 2025-05-30 RX ORDER — ACETAMINOPHEN 500 MG/5ML
1000 LIQUID (ML) ORAL EVERY 6 HOURS
Refills: 0 | Status: COMPLETED | OUTPATIENT
Start: 2025-05-30 | End: 2025-05-31

## 2025-05-30 RX ORDER — SODIUM CHLORIDE 9 G/1000ML
1000 INJECTION, SOLUTION INTRAVENOUS
Refills: 0 | Status: DISCONTINUED | OUTPATIENT
Start: 2025-05-30 | End: 2025-06-01

## 2025-05-30 RX ORDER — SODIUM CHLORIDE 9 G/1000ML
1000 INJECTION, SOLUTION INTRAVENOUS ONCE
Refills: 0 | Status: COMPLETED | OUTPATIENT
Start: 2025-05-30 | End: 2025-05-30

## 2025-05-30 RX ORDER — MAGNESIUM SULFATE 500 MG/ML
1 SYRINGE (ML) INJECTION ONCE
Refills: 0 | Status: COMPLETED | OUTPATIENT
Start: 2025-05-30 | End: 2025-05-30

## 2025-05-30 RX ADMIN — Medication 40 MILLIGRAM(S): at 12:07

## 2025-05-30 RX ADMIN — CEFTRIAXONE 100 MILLIGRAM(S): 500 INJECTION, POWDER, FOR SOLUTION INTRAMUSCULAR; INTRAVENOUS at 12:11

## 2025-05-30 RX ADMIN — Medication 400 MILLIGRAM(S): at 17:41

## 2025-05-30 RX ADMIN — SODIUM CHLORIDE 50 MILLILITER(S): 9 INJECTION, SOLUTION INTRAVENOUS at 21:06

## 2025-05-30 RX ADMIN — SODIUM CHLORIDE 50 MILLILITER(S): 9 INJECTION, SOLUTION INTRAVENOUS at 17:39

## 2025-05-30 RX ADMIN — Medication 1000 MILLIGRAM(S): at 13:00

## 2025-05-30 RX ADMIN — Medication 400 MILLIGRAM(S): at 12:08

## 2025-05-30 RX ADMIN — HEPARIN SODIUM 5000 UNIT(S): 1000 INJECTION INTRAVENOUS; SUBCUTANEOUS at 03:10

## 2025-05-30 RX ADMIN — Medication 1000 MILLIGRAM(S): at 18:00

## 2025-05-30 RX ADMIN — SODIUM CHLORIDE 1000 MILLILITER(S): 9 INJECTION, SOLUTION INTRAVENOUS at 20:02

## 2025-05-30 RX ADMIN — SODIUM CHLORIDE 90 MILLILITER(S): 9 INJECTION, SOLUTION INTRAVENOUS at 06:30

## 2025-05-30 RX ADMIN — Medication 100 GRAM(S): at 12:35

## 2025-05-30 NOTE — DIETITIAN NUTRITION RISK NOTIFICATION - ADDITIONAL COMMENTS/DIETITIAN RECOMMENDATIONS
Pt seen in room for nutrition assessment. Pt on 8 Lachman; pt discussed with interdisciplinary team at interdisciplinary rounds. Pt confused per nursing, alert and oriented 1-2 per medical team; pt not responding when RD greeted her. Pt "contracted" per medical team. Pt currently NPO status. Unable to obtain preferences and/or cultural/Buddhist. Pt from [facility], RD spoke on the phone to RD/nutrition team at this facility, who provided some information. Pt noted to not have a large appetite, some varied/poor PO intakes. Pt noted with pureed diet at this facility. No documented food allergies. Pt noted with Ensure high plus protein oral nutrition supplements at the facility. Pt with wt changes, wt loss of ~15 pounds per [facility] nutrition team, documented wt of 131 pounds on 3/25/25. Dosing wt: 113.5 pounds, Ideal body weight: ~130 pounds, pt is ~87% of ideal body weight. Pt noted with ~13% wt loss in the past x 2 months; this is clinically significant. No noted major GI upset such as nausea, vomiting, diarrhea, constipation, last BM on 5/28/25; noted with audible and normoactive bowel sounds. Left ankle trace edema. Skin: abdominal surgical incisions, stage II left malleoulus. Potential chewing or swallowing, pureed diet noted at previous facility; RD to follow up with speech and medical team. No noted discomfort/pain. Labs reviewed: elevated lactate (2.9), ALP (178), medical team is aware; RD to continue to monitor trends. Nutritionally pertinent medications/supplements: IV fluids, zofran, protonix. Per nutrition focused physical exam, pt noted with moderate orbital fat loss, clavicle muscle loss. Per ASPEN guidelines, pt meets criteria for malnutrition. Education deferred at this time related to cognitive status/alert status. RD will continue to follow.     Malnutrition of severe degree in the context of chronic illness/injury related to inability to meet nutritional demands via PO in the setting of presumed decreased appetite as evidenced by moderate muscle/fat loss per nutrition focused physical exam, ~13% wt loss [id number]  goals: Pt to consistently meet at least 75% of estimated energy expenditure via tolerated route that is consistent with goals of care during hospital stay    Nutrition Recommendations:   1. NPO  **as medically feasible and tolerated, advance diet to regular diet, textures/liquid consistencies per SPEECH pathology and/or medical team**  **Provide additional nourishments and/or oral nutrition supplements per pt preferences and/or if pt's PO intakes are consistently <50%** consider Ensure oral nutrition supplements and/or fortified foods that are similar to those received at previous rehabilitation center  2. Encourage pt to meet nutritional needs as able  3. Monitor PO intakes, trend weights (weekly), monitor skin integrity, monitor labs (electrolytes, CMP, LFTs), monitor GI function   4. Pain and bowel regimen per team  5. Recommend daily multivitamin and thiamine supplementation  6. Will continue to assess/honor preferences as able  7. Align nutrition interventions with goals of care at all times

## 2025-05-30 NOTE — DIETITIAN INITIAL EVALUATION ADULT - NSFNSGIIOFT_GEN_A_CORE
05-29-25 @ 07:01  -  05-30-25 @ 07:00  --------------------------------------------------------  OUT:    Nasogastric/Oral tube (mL): 100 mL  Total OUT: 100 mL    Total NET: -100 mL

## 2025-05-30 NOTE — CONSULT NOTE ADULT - SUBJECTIVE AND OBJECTIVE BOX
79yo Female pt with PMH dementia (AAOx1), CVA w/ LT hemiplegia, bedbound, PVD, HTN, HLD, afib (on Eliquis), sent in from [facility] reporting LT groin pain x24 hours, pt denies any N/V/D, fever, chills, CP, SOB, reports normal BM yx, unsure if passing flatus, last meal yesterday. History limited 2/2 patient dementia, some info per nursing home.      PMH: HTN, HLD, afib (on eliquis), PVD, CVA, wheelchair bound  PSHx: No known abd surgeries per HCP  Medications:   Allergies: NKDA  Social Hx: Lives in [facility]   Family Hx: Denies family hx of IBS, Crohn's, UC, or colon cancer.  Last colonoscopy: Unsure  Last EGD: NA    INTERVAL EVENTS:  POD 1 left femoral hernia repair with mesh, diagnotic lap     SUBJECTIVE:  Patient was seen and examined at bedside. History from chart, patient sleeping, awakes to gentle touch, not answering most questions, looking comfortable. Telemetry reviewed. No other complaints or events reported    Review of systems: unable to obtain     Diet, NPO:   With Ice Chips/Sips of Water (05-30-25 @ 08:03) [Active]      MEDICATIONS:  MEDICATIONS  (STANDING):  acetaminophen   IVPB .. 1000 milliGRAM(s) IV Intermittent every 6 hours  cefTRIAXone   IVPB 2000 milliGRAM(s) IV Intermittent every 24 hours  heparin   Injectable 5000 Unit(s) SubCutaneous every 8 hours  lactated ringers. 1000 milliLiter(s) (90 mL/Hr) IV Continuous <Continuous>  magnesium sulfate  IVPB 1 Gram(s) IV Intermittent once  pantoprazole  Injectable 40 milliGRAM(s) IV Push every 12 hours    MEDICATIONS  (PRN):  HYDROmorphone  Injectable 0.5 milliGRAM(s) IV Push every 6 hours PRN Moderate Pain (4 - 6)  ondansetron Injectable 4 milliGRAM(s) IV Push every 6 hours PRN Nausea and/or Vomiting      Allergies    No Known Allergies    Intolerances        OBJECTIVE:  Vital Signs Last 24 Hrs  T(C): 36.9 (30 May 2025 09:15), Max: 37.1 (29 May 2025 22:09)  T(F): 98.5 (30 May 2025 09:15), Max: 98.7 (29 May 2025 22:09)  HR: 96 (30 May 2025 08:55) (79 - 101)  BP: 122/51 (30 May 2025 08:55) (94/50 - 128/81)  BP(mean): 74 (30 May 2025 08:55) (54 - 101)  RR: 17 (30 May 2025 08:55) (14 - 25)  SpO2: 100% (30 May 2025 08:55) (98% - 100%)    Parameters below as of 30 May 2025 08:55  Patient On (Oxygen Delivery Method): room air      I&O's Summary    29 May 2025 07:01  -  30 May 2025 07:00  --------------------------------------------------------  IN: 1080 mL / OUT: 520 mL / NET: 560 mL        PHYSICAL EXAM:  General: sleeping, open eyes to gentle touch, looking comfortable, no labored breathing on RA  Lungs: limited, no crackles, no wheezes  Heart: regular + murmur   Abdomen: patient positioning limiting exam, soft, no visible tenderness  Extremities: Contracted, warm, no visible edema. Elizondo in place draining clear urine     LABS:                        7.1    12.56 )-----------( 203      ( 30 May 2025 05:30 )             22.7     05-30    141  |  108  |  12  ----------------------------<  92  4.3   |  23  |  0.56    Ca    8.0[L]      30 May 2025 05:30  Phos  3.7     05-30  Mg     1.6     05-30    TPro  6.8  /  Alb  2.5[L]  /  TBili  0.4  /  DBili  0.2  /  AST  24  /  ALT  11  /  AlkPhos  178[H]  05-29    LIVER FUNCTIONS - ( 29 May 2025 09:22 )  Alb: 2.5 g/dL / Pro: 6.8 g/dL / ALK PHOS: 178 U/L / ALT: 11 U/L / AST: 24 U/L / GGT: x           PT/INR - ( 29 May 2025 09:22 )   PT: 15.4 sec;   INR: 1.34          PTT - ( 29 May 2025 09:22 )  PTT:38.4 sec  CAPILLARY BLOOD GLUCOSE        Urinalysis Basic - ( 30 May 2025 05:30 )    Color: x / Appearance: x / SG: x / pH: x  Gluc: 92 mg/dL / Ketone: x  / Bili: x / Urobili: x   Blood: x / Protein: x / Nitrite: x   Leuk Esterase: x / RBC: x / WBC x   Sq Epi: x / Non Sq Epi: x / Bacteria: x        MICRODATA:    Urinalysis with Rflx Culture (collected 29 May 2025 13:29)        RADIOLOGY/OTHER STUDIES:

## 2025-05-30 NOTE — CONSULT NOTE ADULT - PROBLEM SELECTOR RECOMMENDATION 5
-[facility]. DNR. DNI.  -Surrogate: Omer Lopez  -See [facility] note.   -Palliative will continue to follow.

## 2025-05-30 NOTE — CONSULT NOTE ADULT - ASSESSMENT
80F w/ dementia, admitted for findings of SBO 2/2 incarcerated loop of ileum within a LT femoral hernia. Now s/p open left femoral hernia repair with mesh, dx laparoscopy w/ healthy bowel (5/29). CT showed concern for Rectosigmoid wall thickening concerning for malignancy with possible metastatic lesions in the liver and lung, encounter for palliative care.

## 2025-05-30 NOTE — CONSULT NOTE ADULT - ASSESSMENT
80F w/ dementia, admitted for findings of SBO 2/2 incarcerated loop of ileum within a LT femoral hernia. Now s/p open left femoral hernia repair with mesh, dx laparoscopy w/ healthy bowel (5/29). CT showed concern for Rectosigmoid wall thickening concerning for malignancy with possible metastatic lesions in the liver and lung. GI consulted for GI malignancy work up.     Per imaging, patient with suspected metastatic process to the liver and lung with possible primary in the colon. In the colon, the lesion is nonobstructing and does not appear to be the cause of any major changes in bowel habits.     Given her age and comorbidities, we called and discussed case with family, son HCP. Mr. Lopez who states that given the concern that there may be metastatic disease he doesn't think she should or would want to undergo chemotherapy/radiation/resection for any type of maligna 80F w/ dementia, admitted for findings of SBO 2/2 incarcerated loop of ileum within a LT femoral hernia. Now s/p open left femoral hernia repair with mesh, dx laparoscopy w/ healthy bowel (5/29). CT showed concern for Rectosigmoid wall thickening concerning for malignancy with possible metastatic lesions in the liver and lung. GI consulted for GI malignancy work up.     Per imaging, patient with suspected metastatic process to the liver and lung with possible primary in the colon. In the colon, the lesion is nonobstructing and does not appear to be the cause of any major changes in bowel habits.     Given her age and comorbidities, we called and discussed case with family, son HCP. Mr. Lopez who states that given the concern that there may be metastatic disease he doesn't think she should or would want to undergo chemotherapy/radiation/resection for any type of malignancy. Given that the lesion in the colon is nonobstructing, he doesn't think we should persue and invasive work up or diagnostic testing at this time.     I let him know to expect a similiar discussion with primary team but that we would plan not to do any work up for the concern for GI malignancy.   We are available if anything changes or if family or team have any change in their directives.     Recommendations:  -Case discussed with family  -No plan for GI intervention or diagnostic work up with invasive procedures (bowel prep/colonoscopy) at this time.  -GI to remain available should anything change     GI to sign off, please do not hesitate to call us back if anything changes    Patient seen and discussed with GI Attending on Rounds Dr. Brendon Curran DO, PhD  Gastroenterology Fellow  GI Consult Weekday 7am-5pm Pager: [phone]  Weeknights/Weekend/Holiday Coverage: Please Call the  for contact info   80F w/ dementia, admitted for findings of SBO 2/2 incarcerated loop of ileum within a LT femoral hernia. Now s/p open left femoral hernia repair with mesh, dx laparoscopy w/ healthy bowel (5/29). CT showed concern for Rectosigmoid wall thickening concerning for malignancy with possible metastatic lesions in the liver and lung. GI consulted for GI malignancy work up.     Per imaging, patient with suspected metastatic process to the liver and lung with possible primary in the colon. In the colon, the lesion is nonobstructing and does not appear to be the cause of any major changes in bowel habits.     Given her age and comorbidities, we called and discussed case with family, son HCP. Mr. Lopez who states that given the concern that there may be metastatic disease he doesn't think she should or would want to undergo chemotherapy/radiation/resection for any type of malignancy. Given that the lesion in the colon is nonobstructing, he doesn't think we should pursue and invasive work up or diagnostic testing at this time.     I let him know to expect a similiar discussion with primary team but that we would plan not to do any work up for the concern for GI malignancy.   We are available if anything changes or if family or team have any change in their directives.     Recommendations:  -Case discussed with family  -No plan for GI intervention or diagnostic work up with invasive procedures (bowel prep/colonoscopy) at this time.  -GI to remain available should anything change     GI to sign off, please do not hesitate to call us back if anything changes    Patient seen and discussed with GI Attending on Rounds Dr. Brendon Curran DO, PhD  Gastroenterology Fellow  GI Consult Weekday 7am-5pm Pager: [phone]  Weeknights/Weekend/Holiday Coverage: Please Call the  for contact info

## 2025-05-30 NOTE — DIETITIAN INITIAL EVALUATION ADULT - ETIOLOGY
related to inability to meet nutritional demands via PO in the setting of presumed decreased appetite

## 2025-05-30 NOTE — DIETITIAN INITIAL EVALUATION ADULT - NSFNSPHYEXAMSKINFT_GEN_A_CORE
Pressure Injury 1: Left:, malleolus, Stage II  Pressure Injury 2: none, none  Pressure Injury 3: none, none  Pressure Injury 4: none, none  Pressure Injury 5: none, none  Pressure Injury 6: none, none  Pressure Injury 7: none, none  Pressure Injury 8: none, none  Pressure Injury 9: none, none  Pressure Injury 10: none, none  Pressure Injury 11: none, none, Pressure Injury 1: Right:, malleolus, Stage I, Stage II, suspected stage I or II  Pressure Injury 2: none, none  Pressure Injury 3: none, none  Pressure Injury 4: none, none  Pressure Injury 5: none, none  Pressure Injury 6: none, none  Pressure Injury 7: none, none  Pressure Injury 8: none, none  Pressure Injury 9: none, none  Pressure Injury 10: none, none  Pressure Injury 11: none, none

## 2025-05-30 NOTE — DIETITIAN INITIAL EVALUATION ADULT - OTHER CALCULATIONS
Pt is within ~% ideal body weight, thus actual body weight used for all calculations. Needs adjusted for advanced age, malnutrition, postoperative status.

## 2025-05-30 NOTE — CONSULT NOTE ADULT - NS ATTEST RISK PROBLEM GEN_ALL_CORE FT
Decision Made To Not Resuscitate (DNR)  Acute Illness That Poses A Threat To Life  Chronic Illness With Severe Exacerbation/Progression

## 2025-05-30 NOTE — CONSULT NOTE ADULT - PROBLEM SELECTOR RECOMMENDATION 2
-CVA with residual   -FAST 7A  -High risk for delirium and related complications.  -Delirium precautions: bedside window with blinds open; frequent re-orientation; pictures of family if possible; minimize lines, physical restraints, nighttime awakenings as medically feasible; ensure bowel movements daily or every other day, monitor for urinary retention; avoid anticholinergic medications or benzodiazepines as clinically feasible.

## 2025-05-30 NOTE — CONSULT NOTE ADULT - CONVERSATION DETAILS
Introduced the role of palliative medicine for ACP, GOC, and support. Reviewed the patient's experience with illness. Family verbalized good understanding of the patient's acute and chronic illness, reason for hospitalization and clinical course. Discussed in detail concern for acute anemia and CT findings concerning for possible malignancy. Explored GOC. Given his mother's advanced age and overall poor functional status at baseline, Omer does not feel that pursuing invasive diagnostic work up would be of benefit to the patient. Regarding LST, Omer confirmed that he wishes for patient to be DNR/DNI as documented in [facility]. All questions answered. Emotional support provided.

## 2025-05-30 NOTE — DIETITIAN INITIAL EVALUATION ADULT - OTHER INFO
This is an 80 year old Female pt with past medical history significant for dementia (AAOx1), CVA with LT hemiplegia, bedbound, PVD, HTN, HLD, afib (on Eliquis), sent in from [facility] reporting LT groin pain x24 hours, pt denies any N/V/D, fever, chills, CP, SOB, reports normal BM yx, unsure if passing flatus, last meal yesterday. History limited secondary to patient dementia, some info per nursing home.    Pt seen in room for nutrition assessment. Pt on 8 Lachman; pt discussed with interdisciplinary team at interdisciplinary rounds. Pt confused per nursing, alert and oriented 1-2 per medical team; pt not responding when RD greeted her. Pt "contracted" per medical team. Pt currently NPO status. Unable to obtain preferences and/or cultural/Spiritism. No documented food allergies. Pt noted with Ensure high plus protein . Denies wt changes, reports wt stability at current wt. Dosing wt: Ideal body weight: pt is % of ideal body weight. Denies nausea, vomiting, diarrhea, constipation, last BM. No edema. Skin: . Nico: No issues chewing or swallowing noted. Denies pain. Labs reviewed: ; RD to continue to monitor trends. Nutritionally pertinent medications/supplements: . Pt amenable to education; RD provided education in regards to the importance of adequate macro and micronutrients, as well as hydration to support ADLs, maintain energy levels and overall functional/nutritional status. General healthful education provided. Pt was receptive and verbalized understanding. No additional nutrition-related concerns. Will continue to follow. Additional nutrition recommendations below to follow. This is an 80 year old Female pt with past medical history significant for dementia (AAOx1), CVA with LT hemiplegia, bedbound, PVD, HTN, HLD, Afib (on Eliquis), sent in from [facility] reporting LT groin pain x24 hours, pt denies any N/V/D, fever, chills, CP, SOB, reports normal BM yesterday, unsure if passing flatus, last meal yesterday. History limited secondary to patient dementia, some info per nursing home.    Pt seen in room for nutrition assessment. Pt on 8 Lachman; pt discussed with interdisciplinary team at interdisciplinary rounds. Pt confused per nursing, alert and oriented 1-2 per medical team; pt not responding when RD greeted her. Pt "contracted" per medical team. Pt currently NPO status. Unable to obtain preferences and/or cultural/Yazdanism. Pt from [facility], RD spoke on the phone to RD/nutrition team at this facility, who provided some information. Pt noted to not have a large appetite, some varied/poor PO intakes. Pt noted with pureed diet at this facility. No documented food allergies. Pt noted with Ensure high plus protein oral nutrition supplements at the facility. Pt with wt changes, wt loss of ~15 pounds per [facility] [facility] nutrition team, documented wt of 131 pounds on 3/25/25. Dosing wt: 113.5 pounds, Ideal body weight: ~130 pounds, pt is ~87% of ideal body weight. Pt noted with ~13% wt loss in the past x 2 months; this is clinically significant. No noted major GI upset such as nausea, vomiting, diarrhea, constipation, last BM on 5/28/25; noted with audible and normoactive bowel sounds. Left ankle trace edema. Skin: abdominal surgical incisions, stage II left malleoulus. Potential chewing or swallowing, pureed diet noted at previous facility; RD to follow up with speech and medical team. No noted discomfort/pain. Labs reviewed: elevated lactate (2.9), ALP (178), medical team is aware; RD to continue to monitor trends. Nutritionally pertinent medications/supplements: IV fluids, zofran, protonix. Per nutrition focused physical exam, pt noted with moderate orbital fat loss, clavicle muscle loss. Per ASPEN guidelines, pt meets criteria for malnutrition. Education deferred at this time related to cognitive status/alert status. RD will continue to follow. Additional nutrition recommendations below to follow.

## 2025-05-30 NOTE — PATIENT PROFILE ADULT - FALL HARM RISK - HARM RISK INTERVENTIONS

## 2025-05-30 NOTE — PROGRESS NOTE ADULT - SUBJECTIVE AND OBJECTIVE BOX
POST-OP DAY: #1 s/p open L femoral hernia repair with mesh, dx laparoscopy      SUBJECTIVE: Patient seen and examined bedside by chief resident on AM rounds. Pt resting comfortably and in no acute distress. No notable overnight events. Pt denies any nausea/vomiting around the NGT. Unable to discern bowel function secondary to pt baseline mental status.     cefTRIAXone   IVPB 2000 milliGRAM(s) IV Intermittent every 24 hours  heparin   Injectable 5000 Unit(s) SubCutaneous every 8 hours    MEDICATIONS  (PRN):  HYDROmorphone  Injectable 0.5 milliGRAM(s) IV Push every 6 hours PRN Moderate Pain (4 - 6)  ondansetron Injectable 4 milliGRAM(s) IV Push every 6 hours PRN Nausea and/or Vomiting      I&O's Detail    29 May 2025 07:01  -  30 May 2025 07:00  --------------------------------------------------------  IN:    Lactated Ringers: 1080 mL  Total IN: 1080 mL    OUT:    Indwelling Catheter - Urethral (mL): 420 mL    Nasogastric/Oral tube (mL): 100 mL    Oral Fluid: 0 mL  Total OUT: 520 mL    Total NET: 560 mL          Vital Signs Last 24 Hrs  T(C): 37.1 (30 May 2025 04:50), Max: 37.6 (29 May 2025 09:18)  T(F): 98.7 (30 May 2025 04:50), Max: 99.7 (29 May 2025 09:18)  HR: 90 (30 May 2025 04:35) (79 - 172)  BP: 125/77 (30 May 2025 04:35) (94/50 - 130/69)  BP(mean): 91 (30 May 2025 04:35) (54 - 101)  RR: 18 (30 May 2025 04:35) (14 - 25)  SpO2: 100% (30 May 2025 04:35) (98% - 100%)    Parameters below as of 30 May 2025 04:35  Patient On (Oxygen Delivery Method): room air        General: NAD, resting comfortably in bed  HEENT: NGT with scant output  Pulm: Nonlabored breathing, no respiratory distress  Abd: soft, NT/ND. incision c/d/i   : tompkins draining clear, yellow urine   Extrem: WWP, no edema, SCDs in place    LABS:                        10.0   14.94 )-----------( 256      ( 29 May 2025 09:22 )             32.6     05-29    142  |  105  |  16  ----------------------------<  116[H]  4.0   |  26  |  0.54    Ca    8.6      29 May 2025 09:22  Mg     1.8     05-29    TPro  6.8  /  Alb  2.5[L]  /  TBili  0.4  /  DBili  0.2  /  AST  24  /  ALT  11  /  AlkPhos  178[H]  05-29    PT/INR - ( 29 May 2025 09:22 )   PT: 15.4 sec;   INR: 1.34          PTT - ( 29 May 2025 09:22 )  PTT:38.4 sec  Urinalysis Basic - ( 29 May 2025 13:29 )    Color: Yellow / Appearance: Cloudy / SG: >1.030 / pH: x  Gluc: x / Ketone: x  / Bili: Negative / Urobili: 1.0 mg/dL   Blood: x / Protein: 30 mg/dL / Nitrite: Positive   Leuk Esterase: Moderate / RBC: 5 /HPF / WBC 49 /HPF   Sq Epi: x / Non Sq Epi: 2 /HPF / Bacteria: Many /HPF        RADIOLOGY & ADDITIONAL STUDIES:  CT Abdomen and Pelvis w/ IV Cont:   ACC: [id number] EXAM:  CT ABDOMEN AND PELVIS IC   ORDERED BY: DHAVAL WORRELL     PROCEDURE DATE:  05/29/2025          INTERPRETATION:  CLINICAL INFORMATION: Nausea and vomiting. 80-year-old   female.    COMPARISON: None.    CONTRAST/COMPLICATIONS:  IV Contrast: Isovue 370  90 cc administered   10 cc discarded  Oral Contrast: None  .    PROCEDURE:  CT of the Abdomen and Pelvis was performed.  Sagittal and coronal reformats were performed.    FINDINGS: Patient's arms are down by her sides in CT gantry.      LOWER CHEST: Bilateral breast dense fibroglandular tissues. Likely   bilateral free silicone breast injections. Trace left posterior basal   pleural effusion. Dense mitral valve annular calcification. Aortic valve   calcification. Asymmetric thickening of the septal wall of the left   ventricle as compared to the lateral wall. Small hiatal hernia.    LIVER: Nonenlarged. 2.7 cm hypodensity in segment eight of liver. Suspect   two other hypodensities including 1.1 cm segment seven and 1.2 cm segment   eight.  BILE DUCTS: Normal caliber.  GALLBLADDER: Within normal limits.  SPLEEN: Within normal limits.  PANCREAS: Within normal limits.  ADRENALS: Within normal limits.  KIDNEYS/URETERS: Few bilateral cortical cysts largest measures 2.5 cm   upper pole right kidney.    BLADDER: Within normal limits.  REPRODUCTIVE ORGANS: Unremarkable uterus. Atrophic ovaries.    BOWEL: Evidence of dilated small bowel up to at least 3.1 cm diameter due   to an incarcerated loop of ileum within a left femoral hernia. No bowel   pneumatosis. No mesenteric edema. No mesenteric or portal venous air.   Wall thickening of distal ileal loops contiguous with the left femoral   hernia. Colonic diverticulosis. Colon loaded with fecal material. Appears   to be an approximately 7 cm long segment of wall thickening of the distal   cecum, proximal ascending colon. Differential diagnosis would include   colonic adenocarcinoma or less likely colitis.  PERITONEUM/RETROPERITONEUM: Within small amount of pelvic ascites.  VESSELS: Within normal limits.  LYMPH NODES: No lymphadenopathy.  ABDOMINAL WALL: Within normal limits.  BONES: Diffuse decrease in bony mineralization. Right hip total   arthroplasty. Grade 1 degenerative anterolisthesis of L4 on L5.   Degenerative disc disease L4-5. Wedge compression of superior endplate of   L2 and superior endplate of L3 vertebral bodies.    IMPRESSION:  Distal small bowel obstruction due to an incarcerated left femoral hernia.    Three right lobe liver lesions. Differential diagnosis would include   metastases.    Abnormal segment of wall thickening of the proximal ascending colon.   Findings can be correlated with colonoscopy.    Findings called in to the ER  Kayleen  at 12:14 PM 5/29/2025    --- End of Report ---            ADDISON LOPEZ MD; Attending Radiologist  This document has been electronically signed. May 29 2025 12:16PM (05-29-25 @ 11:36)      Urinalysis with Rflx Culture (collected 05-29-25 @ 13:29)

## 2025-05-30 NOTE — CONSULT NOTE ADULT - PROBLEM SELECTOR RECOMMENDATION 9
-CT showed concern for rectosigmoid wall thickening concerning for malignancy with possible metastatic lesions in the liver and lung.   -GI consulted for GI malignancy work up.   -Invasive work up/diagnostics are NOT within GOC.

## 2025-05-30 NOTE — CONSULT NOTE ADULT - PROBLEM SELECTOR RECOMMENDATION 4
-Current Palliative Performance Scale/Karnofsky Score: 20-30   %  -Preadmit Karnofsky:  30-40 %     -Supportive care

## 2025-05-30 NOTE — PROGRESS NOTE ADULT - ASSESSMENT
80F w/ dementia, admitted for findings of SBO 2/2 incarcerated loop of ileum within a LT femoral hernia. Now s/p open left femoral hernia repair with mesh, dx laparoscopy w/ healthy bowel (5/29).     DC NGT, adv diet to NPO with sips   CTX (5/29- ) for +UA  Pain/Nausea PRN  PPI  Bedbound/IS/SCDs/SQH  Elizondo in place from nursing home  AM Labs

## 2025-05-30 NOTE — CONSULT NOTE ADULT - SUBJECTIVE AND OBJECTIVE BOX
GASTROENTEROLOGY CONSULT NOTE  HPI:  81yo Female pt with PMH dementia (AAOx1), CVA w/ LT hemiplegia, bedbound, PVD, HTN, HLD, afib (on Eliquis), sent in from [facility] nursing [place] reporting LT groin pain x24 hours, pt denies any N/V/D, fever, chills, CP, SOB, reports normal BM yx, unsure if passing flatus, last meal yesterday. History limited 2/2 patient dementia, some info per nursing home.    Pt bedbound, primary medical decision maker family friend Marcos     In the ED, pt afebrile, nontachycardic, normotensive, and satting on RA.     On exam, abd soft, nontender, nondistended, no appreciable hernia to B/L groins, no appreciable incisions.      Labs significant for WBC 14 w/ LT shift, hgb 10, LA 2.9 > 2.3, ,     CTAP showing evidence of dilated small bowel up to at least 3.1 cm 2/2  incarcerated loop of ileum within a left femoral hernia.     PMH: HTN, HLD, afib (on eliquis), PVD, CVA, wheelchair bound  PSHx: No known abd surgeries per HCP  Medications:   Allergies: NKDA  Social Hx: Lives in [facility] nursing [place]   Family Hx: Denies family hx of IBS, Crohn's, UC, or colon cancer.  Last colonoscopy: Unsure  Last EGD: NA    Allergies    No Known Allergies    Intolerances      Home Medications:  amLODIPine 5 mg oral tablet: 1 tab(s) orally once a day (29 May 2025 14:02)  atorvastatin 80 mg oral tablet: 1 tab(s) orally once a day (29 May 2025 14:02)  carvedilol 3.125 mg oral tablet: 1 tab(s) orally once a day (29 May 2025 14:02)  Eliquis 5 mg oral tablet: 1 tab(s) orally 2 times a day (29 May 2025 14:02)  ezetimibe 10 mg oral tablet: 1 tab(s) orally once a day (29 May 2025 14:02)  melatonin 3 mg oral tablet, disintegratin tab(s) orally once a day (29 May 2025 14:02)  mirtazapine 15 mg oral tablet: 1 tab(s) orally once a day (29 May 2025 14:02)  sertraline 50 mg oral tablet: 1 tab(s) orally once a day (2020 11:45)    MEDICATIONS:  MEDICATIONS  (STANDING):  acetaminophen   IVPB .. 1000 milliGRAM(s) IV Intermittent every 6 hours  cefTRIAXone   IVPB 2000 milliGRAM(s) IV Intermittent every 24 hours  pantoprazole  Injectable 40 milliGRAM(s) IV Push every 12 hours    MEDICATIONS  (PRN):  HYDROmorphone  Injectable 0.5 milliGRAM(s) IV Push every 6 hours PRN Moderate Pain (4 - 6)  ondansetron Injectable 4 milliGRAM(s) IV Push every 6 hours PRN Nausea and/or Vomiting    PAST MEDICAL & SURGICAL HISTORY:  Unable to assess given patient's current status.    FAMILY HISTORY:  Unable to assess given patient's current status.    SOCIAL HISTORY:  Unable to assess given patient's current status.    REVIEW OF SYSTEMS:  Unable to assess given patient's current status.      Vital Signs Last 24 Hrs  T(C): 36.9 (30 May 2025 15:30), Max: 37.2 (30 May 2025 13:13)  T(F): 98.5 (30 May 2025 15:30), Max: 99 (30 May 2025 13:13)  HR: 82 (30 May 2025 15:30) (79 - 96)  BP: 98/54 (30 May 2025 15:30) (92/45 - 128/81)  BP(mean): 74 (30 May 2025 15:30) (54 - 101)  RR: 17 (30 May 2025 15:30) (14 - 25)  SpO2: 94% (30 May 2025 15:30) (94% - 100%)    Parameters below as of 30 May 2025 15:30  Patient On (Oxygen Delivery Method): room air       @ 07:  -   @ 07:00  --------------------------------------------------------  IN: 1080 mL / OUT: 520 mL / NET: 560 mL     @ 07:  -   @ 16:09  --------------------------------------------------------  IN: 540 mL / OUT: 175 mL / NET: 365 mL      PHYSICAL EXAM:  General: lying in bed, no acute distress  HEENT: Neck supple, mmm, no jvd  Lungs: Normal respiratory effort, no intercostal retractions  Cardiovascular: regular rate  Abdomen: Soft, diffuse abd tenderness, non-distended; No rebound or guarding  Extremities: wwp, no cce  Neurological: AQUINO, speech fluent (minimal)  Skin: Warm and dry. No obvious rash      LABS:                        6.3    11.81 )-----------( 197      ( 30 May 2025 11:57 )             20.5         141  |  108  |  12  ----------------------------<  92  4.3   |  23  |  0.56    Ca    8.0[L]      30 May 2025 05:30  Phos  3.7       Mg     1.6         TPro  6.8  /  Alb  2.5[L]  /  TBili  0.4  /  DBili  0.2  /  AST  24  /  ALT  11  /  AlkPhos  178[H]      PT/INR - ( 29 May 2025 09:22 )   PT: 15.4 sec;   INR: 1.34       PTT - ( 29 May 2025 09:22 )  PTT:38.4 sec    Urinalysis with Rflx Culture (collected 29 May 2025 13:29)    RADIOLOGY & ADDITIONAL STUDIES:     Reviewed

## 2025-05-30 NOTE — DIETITIAN INITIAL EVALUATION ADULT - ADD RECOMMEND
1. NPO  **as medically feasible and tolerated, advance diet to regular diet, textures/liquid consistencies per SPEECH pathology and/or medical team**  **Provide additional nourishments and/or oral nutrition supplements per pt preferences and/or if pt's PO intakes are consistently <50%** consider Ensure oral nutrition supplements and/or fortified foods that are similar to those received at previous rehabilitation center  2. Encourage pt to meet nutritional needs as able  3. Monitor PO intakes, trend weights (weekly), monitor skin integrity, monitor labs (electrolytes, CMP, LFTs), monitor GI function   4. Encourage adherence to diet education (reinforce as able)  5. Pain and bowel regimen per team  6. Will continue to assess/honor preferences as able  7. Align nutrition interventions with goals of care at all times Nutrition Recommendations:   1. NPO  **as medically feasible and tolerated, advance diet to regular diet, textures/liquid consistencies per SPEECH pathology and/or medical team**  **Provide additional nourishments and/or oral nutrition supplements per pt preferences and/or if pt's PO intakes are consistently <50%** consider Ensure oral nutrition supplements and/or fortified foods that are similar to those received at previous rehabilitation center  2. Encourage pt to meet nutritional needs as able  3. Monitor PO intakes, trend weights (weekly), monitor skin integrity, monitor labs (electrolytes, CMP, LFTs), monitor GI function   4. Pain and bowel regimen per team  5. Recommend daily multivitamin and thiamine supplementation  6. Will continue to assess/honor preferences as able  7. Align nutrition interventions with goals of care at all times

## 2025-05-30 NOTE — CONSULT NOTE ADULT - SUBJECTIVE AND OBJECTIVE BOX
[facility] and Palliative [facility]  Juliette Alvarado, Geriatrics & Palliative Care NP  Contact Info: Call [phone] (HEAL Line) or message on Microsoft Teams    HPI:  81yo Female pt with PMH dementia (AAOx1), CVA w/ LT hemiplegia, bedbound, PVD, HTN, HLD, afib (on Eliquis), sent in from [facility] nursing [place] reporting LT groin pain x24 hours, pt denies any N/V/D, fever, chills, CP, SOB, reports normal BM yx, unsure if passing flatus, last meal yesterday. History limited 2/2 patient dementia, some info per nursing home.    Pt bedbound, primary medical decision maker family friend Marcos     In the ED, pt afebrile, nontachycardic, normotensive, and satting on RA.     On exam, abd soft, nontender, nondistended, no appreciable hernia to B/L groins, no appreciable incisions.      Labs significant for WBC 14 w/ LT shift, hgb 10, LA 2.9 > 2.3, ,     CTAP showing evidence of dilated small bowel up to at least 3.1 cm 2/2  incarcerated loop of ileum within a left femoral hernia.     PMH: HTN, HLD, afib (on eliquis), PVD, CVA, wheelchair bound  PSHx: No known abd surgeries per HCP  Medications:   Allergies: NKDA  Social Hx: Lives in [facility] nursing [place]   Family Hx: Denies family hx of IBS, Crohn's, UC, or colon cancer.  Last colonoscopy: Unsure  Last EGD: NA    Patient seen and examined at bedside. Patient with withdrawn affect. Appears comfortable. Denies complaints.   Comprehensive symptom assessment and GOC exploration as noted below. Further collateral obtained from primary team, chart, and family.     PAST MEDICAL & SURGICAL HISTORY:  No pertinent past medical history      FAMILY HISTORY:   Reviewed    Opiate Naive (Y/N): N  iStop reviewed (Y/N): Yes.   No Rx found on iStop review (Ref#:  [id number])        PDI	First Name	Last Name	Birth Date	Gender	Street Address	[facility]	Zip Code  ALEXSANDER Hayes	1945	Female	[address] [facility]	[address]    Prescription Information      PDI Filter:    PDI	My Rx	Current Rx	Drug Type	Rx Written	Rx Dispensed	Drug	Quantity	Days Supply	Prescriber Name	Prescriber BROCK #	Payment Method	Dispenser  A	N	N		04/15/2025	04/15/2025	dronabinol 5 mg capsule	30	30	Franky García	[id number]	Solo	[facility]    Items that are not checked are not present  PSYCHOSOCIAL ASSESSMENT:  - Significant other/partner: [  ]  Children: [x  ]  - Living Situation: Home [  ] Long term care [x  ]  Rehab[  ]  Other[  ]  - Support system: strong[x  ] adequate[  ] inadequate[  ]  - Bahai/Spiritual practice: deferred   - Role of organized Buddhist important [  ] some [  ] unable to assess dt pt mentation [  ]  - Coping: well[  ]  with difficulty[  ]  poor coping[  ]  unable to assess dt pt mentation [  ]    ADVANCE DIRECTIVES:    - MOLST[x  ]     - Living Will [  ]     DECISION MAKER(s):  - Health Care Proxy(s) [  ]  Surrogate(s)[ x ] Guardian[  ]           Name(s)/Phone Number(s): Omer Lopez [phone]    BASELINE (I)ADLs (prior to admission):  - [place]:  Total[  ] Moderate[  ] Dependent[ x ]    Allergies    No Known Allergies    Intolerances      Medications:      MEDICATIONS  (STANDING):  acetaminophen   IVPB .. 1000 milliGRAM(s) IV Intermittent every 6 hours  cefTRIAXone   IVPB 2000 milliGRAM(s) IV Intermittent every 24 hours  pantoprazole  Injectable 40 milliGRAM(s) IV Push every 12 hours    MEDICATIONS  (PRN):  HYDROmorphone  Injectable 0.5 milliGRAM(s) IV Push every 6 hours PRN Moderate Pain (4 - 6)  ondansetron Injectable 4 milliGRAM(s) IV Push every 6 hours PRN Nausea and/or Vomiting    24 hour PRN use:    acetaminophen   IVPB ..   400 mL/Hr IV Intermittent (05-29-25 @ 20:00)    acetaminophen   IVPB ..   400 mL/Hr IV Intermittent (05-30-25 @ 12:08)      PRESENT SYMPTOMS:   [ ] Patient unable to self report   Source if other than patient:  [ ]Family   [ ]Team     Pain [  ] denies  Location :        Quality:  Radiation:  Timing:  Aggravating factors:  Minimal acceptable level (0-10 scale):   Severity in last 24h (0-10 scale) :  Current score (0-10 scale):  Improves with:     PAIN AD Score:   [email or web address] (press ctrl +  left click to view)    If [  ], pt denies symptom.   Dyspnea:         [  ]  Anxiety:           [  ]  Difficulty sleeping: [  ]  Fatigue:           [  ]  Nausea:           [  ]  Loss of appetite:     [  ]  Dysphagia: [  ]  Constipation:   [  ]        LBM   Other Symptoms:    All other review of systems negative [ x ]    ECOG Performance:       Current Palliative Performance Scale/Karnofsky Score: 20-30   %  Preadmit Karnofsky:  30-40 %          PEx:  General: alert  oriented x 2 (self, hospital), minimally verbal  Behavioral: calm  HEENT: atraumatic,  No dry mouth  RESP: Reg rhythm, No  tachypnea/labored breathing,  No audible excessive secretions  CV: RRR, S1S2,  No  tachycardia  GI: soft non distended non tender   :  incontinent    MUSK: weakness x4,  edema, bed bound  SKIN:  Poor skin turgor, Pallor, Pressure ulcer stage: II, L malleolus  NEURO: cognitive impairment  Oral intake ability:  minimal capability    T(C): 36.9 (05-30-25 @ 15:30), Max: 37.2 (05-30-25 @ 13:13)  HR: 82 (05-30-25 @ 15:30) (79 - 96)  BP: 98/54 (05-30-25 @ 15:30) (92/45 - 128/81)  RR: 17 (05-30-25 @ 15:30) (14 - 25)  SpO2: 94% (05-30-25 @ 15:30) (94% - 100%)  Wt(kg): --    Labs:    CBC:                        6.3    11.81 )-----------( 197      ( 30 May 2025 11:57 )             20.5     CMP:    05-30    141  |  108  |  12  ----------------------------<  92  4.3   |  23  |  0.56    Ca    8.0[L]      30 May 2025 05:30  Phos  3.7     05-30  Mg     1.6     05-30    TPro  6.8  /  Alb  2.5[L]  /  TBili  0.4  /  DBili  0.2  /  AST  24  /  ALT  11  /  AlkPhos  178[H]  05-29    PT/INR - ( 29 May 2025 09:22 )   PT: 15.4 sec;   INR: 1.34          PTT - ( 29 May 2025 09:22 )  PTT:38.4 sec   Urinalysis Basic - ( 30 May 2025 05:30 )    Color: x / Appearance: x / SG: x / pH: x  Gluc: 92 mg/dL / Ketone: x  / Bili: x / Urobili: x   Blood: x / Protein: x / Nitrite: x   Leuk Esterase: x / RBC: x / WBC x   Sq Epi: x / Non Sq Epi: x / Bacteria: x        RADIOLOGY & ADDITIONAL STUDIES:  Imaging:  Reviewed      GOC discussion:

## 2025-05-30 NOTE — CONSULT NOTE ADULT - ASSESSMENT
81 y/o F with above PMH presents with SBO    SBO secondary femoral hernia  S/p hernia repair with mesh  Management per primary team, currently NPO with sips of water  Pending possible colonoscopy with GI    Anemia normocytic, acute on chronic   Patient with progressive drop in Hb since April around 10   No reported bleeding, has been on AC for Afib  Increase PPI IV to q 12h   Continue to monitor Hb, target Hb 7-8,  Get iron level, TIBC, Iron saturation, If low Iron would give IV Iron  Patient reported with possible colon mass, endoscopy per above    Chronic Afib  Rate controlled, on Carvedilol at rehab. If patient remains NPO and rate controlled is needed, can started on Metoprolol 2,5 mg IV q6h  AC on hold post op    Positive UA  Patient unable to provide history, will treat as UTI   Continue Ceftriaxone 2G, Follow-up urine culture  TOV per primary team     Dementia  CVA with residual deficit  Bedbound  Care per nursing protocol    DVT ppx: on hold pending Hb stability  Discussed with primary team

## 2025-05-30 NOTE — DIETITIAN INITIAL EVALUATION ADULT - PERTINENT MEDS FT
MEDICATIONS  (STANDING):  acetaminophen   IVPB .. 1000 milliGRAM(s) IV Intermittent every 6 hours  cefTRIAXone   IVPB 2000 milliGRAM(s) IV Intermittent every 24 hours  lactated ringers. 1000 milliLiter(s) (90 mL/Hr) IV Continuous <Continuous>  pantoprazole  Injectable 40 milliGRAM(s) IV Push every 12 hours    MEDICATIONS  (PRN):  HYDROmorphone  Injectable 0.5 milliGRAM(s) IV Push every 6 hours PRN Moderate Pain (4 - 6)  ondansetron Injectable 4 milliGRAM(s) IV Push every 6 hours PRN Nausea and/or Vomiting

## 2025-05-30 NOTE — DIETITIAN INITIAL EVALUATION ADULT - PERTINENT LABORATORY DATA
05-30    141  |  108  |  12  ----------------------------<  92  4.3   |  23  |  0.56    Ca    8.0[L]      30 May 2025 05:30  Phos  3.7     05-30  Mg     1.6     05-30    TPro  6.8  /  Alb  2.5[L]  /  TBili  0.4  /  DBili  0.2  /  AST  24  /  ALT  11  /  AlkPhos  178[H]  05-29

## 2025-05-31 LAB
ANION GAP SERPL CALC-SCNC: 10 MMOL/L — SIGNIFICANT CHANGE UP (ref 5–17)
BUN SERPL-MCNC: 11 MG/DL — SIGNIFICANT CHANGE UP (ref 7–23)
CALCIUM SERPL-MCNC: 7.8 MG/DL — LOW (ref 8.4–10.5)
CANCER AG19-9 SERPL-ACNC: 20 U/ML — SIGNIFICANT CHANGE UP
CEA SERPL-MCNC: 5.9 NG/ML — HIGH (ref 0–3.8)
CHLORIDE SERPL-SCNC: 108 MMOL/L — SIGNIFICANT CHANGE UP (ref 96–108)
CO2 SERPL-SCNC: 21 MMOL/L — LOW (ref 22–31)
CREAT SERPL-MCNC: 0.52 MG/DL — SIGNIFICANT CHANGE UP (ref 0.5–1.3)
EGFR: 93 ML/MIN/1.73M2 — SIGNIFICANT CHANGE UP
EGFR: 93 ML/MIN/1.73M2 — SIGNIFICANT CHANGE UP
GLUCOSE SERPL-MCNC: 73 MG/DL — SIGNIFICANT CHANGE UP (ref 70–99)
HCT VFR BLD CALC: 24.3 % — LOW (ref 34.5–45)
HGB BLD-MCNC: 7.9 G/DL — LOW (ref 11.5–15.5)
IRON SATN MFR SERPL: 44 UG/DL — SIGNIFICANT CHANGE UP (ref 30–160)
IRON SATN MFR SERPL: 54 UG/DL — SIGNIFICANT CHANGE UP (ref 30–160)
IRON SATN MFR SERPL: 70 % — HIGH (ref 14–50)
IRON SATN MFR SERPL: SIGNIFICANT CHANGE UP % (ref 14–50)
MAGNESIUM SERPL-MCNC: 1.7 MG/DL — SIGNIFICANT CHANGE UP (ref 1.6–2.6)
MCHC RBC-ENTMCNC: 28.4 PG — SIGNIFICANT CHANGE UP (ref 27–34)
MCHC RBC-ENTMCNC: 32.5 G/DL — SIGNIFICANT CHANGE UP (ref 32–36)
MCV RBC AUTO: 87.4 FL — SIGNIFICANT CHANGE UP (ref 80–100)
NRBC BLD AUTO-RTO: 0 /100 WBCS — SIGNIFICANT CHANGE UP (ref 0–0)
PHOSPHATE SERPL-MCNC: 2.3 MG/DL — LOW (ref 2.5–4.5)
PLATELET # BLD AUTO: 203 K/UL — SIGNIFICANT CHANGE UP (ref 150–400)
POTASSIUM SERPL-MCNC: 3.8 MMOL/L — SIGNIFICANT CHANGE UP (ref 3.5–5.3)
POTASSIUM SERPL-SCNC: 3.8 MMOL/L — SIGNIFICANT CHANGE UP (ref 3.5–5.3)
RBC # BLD: 2.78 M/UL — LOW (ref 3.8–5.2)
RBC # FLD: 18.1 % — HIGH (ref 10.3–14.5)
SODIUM SERPL-SCNC: 139 MMOL/L — SIGNIFICANT CHANGE UP (ref 135–145)
TIBC SERPL-MCNC: 63 UG/DL — LOW (ref 220–430)
TIBC SERPL-MCNC: SIGNIFICANT CHANGE UP UG/DL (ref 220–430)
UIBC SERPL-MCNC: 19 UG/DL — LOW (ref 110–370)
UIBC SERPL-MCNC: SIGNIFICANT CHANGE UP UG/DL (ref 110–370)
WBC # BLD: 10.34 K/UL — SIGNIFICANT CHANGE UP (ref 3.8–10.5)
WBC # FLD AUTO: 10.34 K/UL — SIGNIFICANT CHANGE UP (ref 3.8–10.5)

## 2025-05-31 PROCEDURE — 99233 SBSQ HOSP IP/OBS HIGH 50: CPT

## 2025-05-31 RX ORDER — POTASSIUM PHOSPHATE, MONOBASIC POTASSIUM PHOSPHATE, DIBASIC INJECTION, 236; 224 MG/ML; MG/ML
15 SOLUTION, CONCENTRATE INTRAVENOUS ONCE
Refills: 0 | Status: COMPLETED | OUTPATIENT
Start: 2025-05-31 | End: 2025-05-31

## 2025-05-31 RX ORDER — MAGNESIUM SULFATE 500 MG/ML
2 SYRINGE (ML) INJECTION
Refills: 0 | Status: COMPLETED | OUTPATIENT
Start: 2025-05-31 | End: 2025-05-31

## 2025-05-31 RX ADMIN — Medication 1000 MILLIGRAM(S): at 01:00

## 2025-05-31 RX ADMIN — Medication 40 MILLIGRAM(S): at 11:01

## 2025-05-31 RX ADMIN — Medication 1000 MILLIGRAM(S): at 05:34

## 2025-05-31 RX ADMIN — CEFTRIAXONE 100 MILLIGRAM(S): 500 INJECTION, POWDER, FOR SOLUTION INTRAMUSCULAR; INTRAVENOUS at 13:50

## 2025-05-31 RX ADMIN — Medication 100 MILLIEQUIVALENT(S): at 11:00

## 2025-05-31 RX ADMIN — Medication 40 MILLIGRAM(S): at 00:20

## 2025-05-31 RX ADMIN — Medication 100 MILLIEQUIVALENT(S): at 09:14

## 2025-05-31 RX ADMIN — POTASSIUM PHOSPHATE, MONOBASIC POTASSIUM PHOSPHATE, DIBASIC INJECTION, 62.5 MILLIMOLE(S): 236; 224 SOLUTION, CONCENTRATE INTRAVENOUS at 11:00

## 2025-05-31 RX ADMIN — Medication 400 MILLIGRAM(S): at 05:25

## 2025-05-31 RX ADMIN — Medication 25 GRAM(S): at 11:00

## 2025-05-31 RX ADMIN — Medication 25 GRAM(S): at 09:14

## 2025-05-31 RX ADMIN — Medication 400 MILLIGRAM(S): at 00:20

## 2025-05-31 NOTE — PROGRESS NOTE ADULT - SUBJECTIVE AND OBJECTIVE BOX
INTERVAL HPI/OVERNIGHT EVENTS: Failed TOV, BS only 50cc. Gave 1L bolus. Post transfusion Hgb 7.9(6.3). pTOV (150cc primafit).     STATUS POST:  open left femoral hernia repair with mesh, dx laparoscopy w/ healthy bowel    POST OPERATIVE DAY #: 2    SUBJECTIVE: Patient seen at the bedside by the team today. Patient lying in bed resting comfortably in NAD. Denies abdominal pain. -N/-V. -F/-BM.       cefTRIAXone   IVPB 2000 milliGRAM(s) IV Intermittent every 24 hours      Vital Signs Last 24 Hrs  T(C): 36.7 (31 May 2025 05:07), Max: 37.2 (30 May 2025 13:13)  T(F): 98 (31 May 2025 05:07), Max: 99 (30 May 2025 13:13)  HR: 80 (31 May 2025 08:50) (74 - 92)  BP: 115/58 (31 May 2025 08:50) (92/45 - 116/55)  BP(mean): 83 (31 May 2025 08:50) (63 - 83)  RR: 16 (31 May 2025 08:50) (16 - 18)  SpO2: 99% (31 May 2025 08:50) (94% - 100%)    Parameters below as of 31 May 2025 08:50  Patient On (Oxygen Delivery Method): room air      I&O's Detail    30 May 2025 07:01  -  31 May 2025 07:00  --------------------------------------------------------  IN:    IV PiggyBack: 200 mL    Lactated Ringers: 540 mL    Lactated Ringers: 700 mL  Total IN: 1440 mL    OUT:    Indwelling Catheter - Urethral (mL): 175 mL    Voided (mL): 600 mL  Total OUT: 775 mL    Total NET: 665 mL      31 May 2025 07:01  -  31 May 2025 09:15  --------------------------------------------------------  IN:    Lactated Ringers: 50 mL  Total IN: 50 mL    OUT:  Total OUT: 0 mL    Total NET: 50 mL          General: NAD, resting comfortably in bed  C/V: NSR  Pulm: Nonlabored breathing, no respiratory distress  Abd: soft, TTP LLQ/RLQ, mildly distended, no rebound or guarding   Extrem: WWP, no edema, SCDs in place  Drains:  Elizondo:      LABS:                        7.9    10.34 )-----------( 203      ( 31 May 2025 05:30 )             24.3     05-31    139  |  108  |  11  ----------------------------<  73  3.8   |  21[L]  |  0.52    Ca    7.8[L]      31 May 2025 05:30  Phos  2.3     05-31  Mg     1.7     05-31    TPro  6.8  /  Alb  2.5[L]  /  TBili  0.4  /  DBili  0.2  /  AST  24  /  ALT  11  /  AlkPhos  178[H]  05-29    PT/INR - ( 29 May 2025 09:22 )   PT: 15.4 sec;   INR: 1.34          PTT - ( 29 May 2025 09:22 )  PTT:38.4 sec  Urinalysis Basic - ( 31 May 2025 05:30 )    Color: x / Appearance: x / SG: x / pH: x  Gluc: 73 mg/dL / Ketone: x  / Bili: x / Urobili: x   Blood: x / Protein: x / Nitrite: x   Leuk Esterase: x / RBC: x / WBC x   Sq Epi: x / Non Sq Epi: x / Bacteria: x        RADIOLOGY & ADDITIONAL STUDIES:

## 2025-05-31 NOTE — CONSULT NOTE ADULT - SUBJECTIVE AND OBJECTIVE BOX
HPI: 79 yo F pt with PMH dementia (AAOx1), CVA w/ LT hemiplegia & bedbound, PVD, HTN, HLD, atrial fibrillation (on Eliquis), initially sent in from [facility] with left groin pain. Found to have SBO with incarcerated loops of ileum within a left femoral hernia and likely colonic mass. Admitted to surgical service and is now POD 2 for L femoral hernia repair with mesh placement.     Cardiology was consulted for SVT noted on telemetry on POD 2. Her HR elevated to 160s during which time she was asymptomatic with a systolic blood pressure of 109. The rhythm returned to normal without intervention.       ROS: A 10-point review of systems was otherwise negative.    PAST MEDICAL & SURGICAL HISTORY:  Dementia  CVA  PVD  HTN  HLD   Atrial fibrillation    SOCIAL HISTORY: Lives at [facility]. Bedbound. Family friend is primary decision maker.   FAMILY HISTORY: Unknown     ALLERGIES: 	  No Known Allergies    MEDICATIONS:  HYDROmorphone  Injectable 0.5 milliGRAM(s) IV Push every 6 hours PRN  lactated ringers. 1000 milliLiter(s) IV Continuous <Continuous>  ondansetron Injectable 4 milliGRAM(s) IV Push every 6 hours PRN  pantoprazole  Injectable 40 milliGRAM(s) IV Push every 12 hours      HOME MEDICATIONS:  amLODIPine 5 mg oral tablet: 1 tab(s) orally once a day  atorvastatin 80 mg oral tablet: 1 tab(s) orally once a day  carvedilol 3.125 mg oral tablet: 1 tab(s) orally once a day  Eliquis 5 mg oral tablet: 1 tab(s) orally 2 times a day  ezetimibe 10 mg oral tablet: 1 tab(s) orally once a day  melatonin 3 mg oral tablet, disintegratin tab(s) orally once a day  mirtazapine 15 mg oral tablet: 1 tab(s) orally once a day  sertraline 50 mg oral tablet: 1 tab(s) orally once a day    PHYSICAL EXAM:    I/O Summary 24H    IN: 1440 mL / OUT: 775 mL / NET: 665 mL    T(F): 97.2 (25 @ 14:20), Max: 98.8 (25 @ 09:15)  HR: 78 (25 @ 13:11) (74 - 160)  BP: 117/59 (25 @ 13:11) (96/50 - 117/59)  BP(mean): 82 (25 @ 13:11) (68 - 83)  ABP: --  ABP(mean): --  RR: 16 (25 @ 13:11) (16 - 18)  SpO2: 100% (25 @ 13:11) (95% - 100%)  CVP(mm Hg): --    Physical Exam  General: AAOx1, NAD, laying comfortably in bed  Cardio: S1,S2, No MRG  Pulm: Nonlabored breathing  Abdomen:  Extremities: WWP, peripheral pulses appreciated    LABS:	 	    Cardiac Markers     CBC 25 @ 05:30                        7.9    10.34 )-----------( 203                   24.3     Hgb trend: 7.9 <-- , 7.9 <-- , 6.3 <-- , 7.1 <-- , 10.0 <--   WBC trend: 10.34 <-- , 10.85 <-- , 11.81 <-- , 12.56 <-- , 14.94 <--     CMP 25 @ 05:30    139  |  108  |  11  ----------------------------<  73  3.8   |  21[L]  |  0.52    Ca    7.8[L]      25 @ 05:30  Phos  2.3       Mg     1.7           Serum Cr (eGFR) trend: 0.52 () <-- , 0.56 () <-- , 0.54 () <--    HPI: 81 yo F pt with PMH dementia (AAOx1), CVA w/ LT hemiplegia & bedbound, PVD, HTN, HLD, atrial fibrillation (on Eliquis), initially sent in from [facility] with left groin pain. Found to have SBO with incarcerated loops of ileum within a left femoral hernia and likely colonic mass. Admitted to surgical service and is now POD 2 for L femoral hernia repair with mesh placement.     Cardiology was consulted for SVT noted on telemetry on POD 2. Her HR elevated to 160s during which time she was asymptomatic with a systolic blood pressure of 109. The rhythm returned to normal without intervention. Patient seen & examined at bedside. Limited history due to mentation.  Telemetry reviewed. Currently NSR with PACs, HR 80s. At 1023 am HR went up to 164. On telemetry with frequent PACs, bursts of SVT and AF.     PAST MEDICAL & SURGICAL HISTORY:  Dementia  CVA  PVD  HTN  HLD   Atrial fibrillation    SOCIAL HISTORY: Lives at [facility]. Bedbound. Family friend is primary decision maker.   FAMILY HISTORY: Unknown     ALLERGIES: 	  No Known Allergies    MEDICATIONS:  HYDROmorphone  Injectable 0.5 milliGRAM(s) IV Push every 6 hours PRN  lactated ringers. 1000 milliLiter(s) IV Continuous <Continuous>  ondansetron Injectable 4 milliGRAM(s) IV Push every 6 hours PRN  pantoprazole  Injectable 40 milliGRAM(s) IV Push every 12 hours      HOME MEDICATIONS:  amLODIPine 5 mg oral tablet: 1 tab(s) orally once a day  atorvastatin 80 mg oral tablet: 1 tab(s) orally once a day  carvedilol 3.125 mg oral tablet: 1 tab(s) orally once a day  Eliquis 5 mg oral tablet: 1 tab(s) orally 2 times a day  ezetimibe 10 mg oral tablet: 1 tab(s) orally once a day  melatonin 3 mg oral tablet, disintegratin tab(s) orally once a day  mirtazapine 15 mg oral tablet: 1 tab(s) orally once a day  sertraline 50 mg oral tablet: 1 tab(s) orally once a day    PHYSICAL EXAM:    I/O Summary 24H    IN: 1440 mL / OUT: 775 mL / NET: 665 mL    T(F): 97.2 (25 @ 14:20), Max: 98.8 (25 @ 09:15)  HR: 78 (25 @ 13:11) (74 - 160)  BP: 117/59 (25 @ 13:11) (96/50 - 117/59)  BP(mean): 82 (25 @ 13:11) (68 - 83)  ABP: --  ABP(mean): --  RR: 16 (25 @ 13:11) (16 - 18)  SpO2: 100% (25 @ 13:11) (95% - 100%)  CVP(mm Hg): --    Physical Exam  General: AAOx1, NAD, laying comfortably in bed  Cardio: S1,S2, intermittently irregular, systolic murmur noted throughout.   Pulm: Clear to auscultation bilaterally  Extremities: WWP, no edema. Pressure wound/dressing on medial malleolus    LABS:	 	    Cardiac Markers     CBC 25 @ 05:30                        7.9    10.34 )-----------( 203                   24.3     Hgb trend: 7.9 <-- , 7.9 <-- , 6.3 <-- , 7.1 <-- , 10.0 <--   WBC trend: 10.34 <-- , 10.85 <-- , 11.81 <-- , 12.56 <-- , 14.94 <--     CMP 25 @ 05:30    139  |  108  |  11  ----------------------------<  73  3.8   |  21[L]  |  0.52    Ca    7.8[L]      25 @ 05:30  Phos  2.3       Mg     1.7           Serum Cr (eGFR) trend: 0.52 () <-- , 0.56 () <-- , 0.54 () <--

## 2025-05-31 NOTE — PROGRESS NOTE ADULT - ASSESSMENT
80F w/ dementia, CVA w/ LT hemiplegia,admitted for findings of SBO 2/2 incarcerated loop of ileum within a LT femoral hernia and incidental finding of anemia with possible met ascending colon cancer on CT. Now s/p open left femoral hernia repair with mesh, dx laparoscopy w/ healthy bowel (5/29).     NPO w/ sips/IVF  CTX (5/29-6/1) for +UA  Pain/Nausea PRN  PPI BID   GI recs   Holding SQH (anemia)   Bedbound/IS/SCDs  AM Labs 80F w/ dementia, CVA w/ LT hemiplegia,admitted for findings of SBO 2/2 incarcerated loop of ileum within a LT femoral hernia and incidental finding of anemia with possible met ascending colon cancer on CT. Now s/p open left femoral hernia repair with mesh, dx laparoscopy w/ healthy bowel (5/29). Discussion with palliative care pending as patient id currently declining additional interventions at this time. Will reinstate DNR/DNI.     NPO w/ sips/IVF  CTX (5/29-6/1) for +UA  Pain/Nausea PRN  PPI BID   GI recs   Holding SQH (anemia)   Bedbound/IS/SCDs  AM Labs

## 2025-05-31 NOTE — CONSULT NOTE ADULT - TIME BILLING
Bedside exam and interview   Reviewed vitals, labs, imaging, HIE  Discussed patient's plan of care with primary team   Documentation of encounter  Time spent on the encounter excludes teaching and separately reported services
Data analysis and patient encounter
Attending Attestation (For Attendings USE Only)...

## 2025-05-31 NOTE — PROGRESS NOTE ADULT - ASSESSMENT
79 y/o F with above PMH presents with SBO    SBO secondary femoral hernia  S/p hernia repair with mesh  Management per primary team, currently NPO with sips of water. Would give albumin today 250cc x3.   Per GO, family not interested in invasive procedures     Anemia normocytic, acute on chronic   Patient with progressive drop in Hb since April around 10   No reported bleeding, has been on AC for Afib  Continue IV PPI  Continue to monitor Hb, target Hb 7-8,    Chronic Afib  Rate controlled, on Carvedilol at rehab. If patient remains NPO and rate controlled is needed, can started on Metoprolol 2,5 mg IV q6h  AC on hold post op    Positive UA  Patient unable to provide history, will treat as UTI   Continue Ceftriaxone 2G, Follow-up urine culture  TOV per primary team     Dementia  CVA with residual deficit  Bedbound  Care per nursing protocol    DVT ppx: on hold pending Hb stability  Discussed with primary team

## 2025-05-31 NOTE — CONSULT NOTE ADULT - ATTENDING COMMENTS
80F w/ dementia (AAOx1), CVA now bedbound, afib (on Eliquis). p/w groin pain from nursing home, found to have asc colon wall thickening and 3 lesions in the liver c/f mets on CT. GI consulted for the above. Pt disoriented on exam and unable to provide history, abd non distended and soft though she withdraws to deep palpations in all quadrants of the stomach.     Suspect a malignant underlying process, possible primary in the col w/ likely mets to the liver. Given comorbidities and poor functional status, however, pt is poor candidate therapeutic options, and has poor prognosis. Luckily, no e/o bowel obstruction currently. Discussed findings w/ family, who wished for a conservative approach that would avoid procedures.     As such recommend against endoscopic workup at this time and to further explore GOC w/ family. Should the family's opinion change and they would like to pursue a COL for dx purposes, kindly reconsult the GI team for re-evaluation.
This is an elderly 81 y/o F with advanced dementia and prior debilitating CVA (bedbound, hemiplegic), AFib on AC, and HTN/HLD, who presented with  SBO w/ incarcerated bowel now s/p urgent hernia repair w/ mesh. Post operatively, patient had bursts of AFib with RVR which were well tolerated from a BP perspective and self limited.     There are ongoing palliative discussions in place.  Limited TTE by fellow with normal biventricular function.     - Agree with IV Metoprolol pushes until patient can take PO again   - Increase to to Metoprolol 5 mg q6hr   - Once tolerating PO, transition to home Coreg (3.125 mg BID) OR Metoprolol 25 mg XL qD (which is a daily medication, for ease of administration)  - Anemia noted, patient restarted on home Eliquis 2.5 mg BID   - Formal TTE prior to discharge     Remainder as per fellow note.    Boo Rodriguez MD  Interventional Cardiology

## 2025-05-31 NOTE — PROGRESS NOTE ADULT - SUBJECTIVE AND OBJECTIVE BOX
INTERVAL EVENTS: no appetite, no complaints    PAST MEDICAL & SURGICAL HISTORY:  No pertinent past medical history        MEDICATIONS  (STANDING):  cefTRIAXone   IVPB 2000 milliGRAM(s) IV Intermittent every 24 hours  lactated ringers. 1000 milliLiter(s) (50 mL/Hr) IV Continuous <Continuous>  pantoprazole  Injectable 40 milliGRAM(s) IV Push every 12 hours    MEDICATIONS  (PRN):  HYDROmorphone  Injectable 0.5 milliGRAM(s) IV Push every 6 hours PRN Moderate Pain (4 - 6)  ondansetron Injectable 4 milliGRAM(s) IV Push every 6 hours PRN Nausea and/or Vomiting    T(F): 98.8 (05-31-25 @ 09:15), Max: 99 (05-30-25 @ 13:13)  HR: 160 (05-31-25 @ 10:23) (74 - 160)  BP: 109/62 (05-31-25 @ 10:23) (92/45 - 116/55)  BP(mean): 78 (05-31-25 @ 10:23) (63 - 83)  ABP: --  ABP(mean): --  RR: 16 (05-31-25 @ 10:23) (16 - 18)  SpO2: 100% (05-31-25 @ 10:23) (94% - 100%)  CVP(mm Hg): --    I/O Detail 24H    30 May 2025 07:01  -  31 May 2025 07:00  --------------------------------------------------------  IN:    IV PiggyBack: 200 mL    Lactated Ringers: 540 mL    Lactated Ringers: 700 mL  Total IN: 1440 mL    OUT:    Indwelling Catheter - Urethral (mL): 175 mL    Voided (mL): 600 mL  Total OUT: 775 mL    Total NET: 665 mL      31 May 2025 07:01  -  31 May 2025 11:35  --------------------------------------------------------  IN:    Lactated Ringers: 50 mL  Total IN: 50 mL    OUT:  Total OUT: 0 mL    Total NET: 50 mL          PHYSICAL EXAM:  General: sleeping, open eyes to gentle touch, looking comfortable, no labored breathing on RA  Lungs: limited, no crackles, no wheezes  Heart: regular + murmur   Abdomen: patient positioning limiting exam, soft, no visible tenderness  Extremities: Contracted, warm, no visible edema. Elizondo in place draining clear urine     LABS:  CBC 05-31-25 @ 05:30                        7.9    10.34 )-----------( 203                   24.3     Hgb trend: 7.9 <-- , 7.9 <-- , 6.3 <-- , 7.1 <-- , 10.0 <--   WBC trend: 10.34 <-- , 10.85 <-- , 11.81 <-- , 12.56 <-- , 14.94 <--       CMP 05-31-25 @ 05:30    139  |  108  |  11  ----------------------------<  73  3.8   |  21[L]  |  0.52    Ca    7.8[L]      05-31-25 @ 05:30  Phos  2.3     05-31  Mg     1.7     05-31      Serum Cr (eGFR) trend: 0.52 (93) <-- , 0.56 (92) <-- , 0.54 (92) <--           Cardiac Markers         STUDIES:

## 2025-05-31 NOTE — CONSULT NOTE ADULT - ASSESSMENT
79 yo F pt with PMH dementia (AAOx1), CVA w/ LT hemiplegia & bedbound, PVD, HTN, HLD, atrial fibrillation (on Eliquis), initially sent in from [facility] with left groin pain. Found to have SBO with incarcerated loops of ileum within a left femoral hernia and likely colonic mass. Admitted to surgical service and is now POD 2 for L femoral hernia repair with mesh placement. Post op noted to have SVT on telemetry for which cardiology was consulted.    Home Medications: Amlodipine 5mg QD, Coreg 3.125 mg ?QD, Atorvastatin 80mg, Eliquis 5mg Q12h, Ezetemibe 10mg     # Atrial fibrillation 79 yo F pt with PMH dementia (AAOx1), CVA w/ LT hemiplegia & bedbound, PVD, HTN, HLD, atrial fibrillation (on Eliquis), initially sent in from [facility] with left groin pain. Found to have SBO with incarcerated loops of ileum within a left femoral hernia and likely colonic mass. Admitted to surgical service and is now POD 2 for L femoral hernia repair with mesh placement. Post op noted to have SVT on telemetry for which cardiology was consulted.    Home Medications: Amlodipine 5mg QD, Coreg 3.125 mg ?QD, Atorvastatin 80mg, Eliquis 5mg Q12h, Ezetemibe 10mg     # Atrial fibrillation  On Coreg 3.125mg at rehab (dose reported as QD but this is a BID medication) which is on hold given NPO status, Eliquis (on hold post-op)  - Intermittent AF with episode of SVT (regular, AT vs other) HR 160s briefly around 10:23 am until 10:30 am which resolved without intervention. Currently NSR with APCs and bursts of AF. Likely with high rate episodes iso NPO status and holding of coreg post-operatively.   - Received volume resuscitation with albumin today  - Given NPO status, would start Lopressor 2.5 mg IV q6h. Hold for HR < 60. Can give additional PRN dose if HR sustained elevation above 130s.   - Once taking PO, re-start home Coreg 3.125 mg and ensure BID dosing  - Once safe per primary team re-start home eliquis     All recommendations preliminary until plan discussed with attg and note cosigned.

## 2025-06-01 LAB
ANION GAP SERPL CALC-SCNC: 9 MMOL/L — SIGNIFICANT CHANGE UP (ref 5–17)
BUN SERPL-MCNC: 7 MG/DL — SIGNIFICANT CHANGE UP (ref 7–23)
CALCIUM SERPL-MCNC: 7.5 MG/DL — LOW (ref 8.4–10.5)
CHLORIDE SERPL-SCNC: 108 MMOL/L — SIGNIFICANT CHANGE UP (ref 96–108)
CO2 SERPL-SCNC: 20 MMOL/L — LOW (ref 22–31)
CREAT SERPL-MCNC: 0.48 MG/DL — LOW (ref 0.5–1.3)
EGFR: 95 ML/MIN/1.73M2 — SIGNIFICANT CHANGE UP
EGFR: 95 ML/MIN/1.73M2 — SIGNIFICANT CHANGE UP
GLUCOSE SERPL-MCNC: 61 MG/DL — LOW (ref 70–99)
HCT VFR BLD CALC: 27.6 % — LOW (ref 34.5–45)
HGB BLD-MCNC: 8.7 G/DL — LOW (ref 11.5–15.5)
MAGNESIUM SERPL-MCNC: 2.2 MG/DL — SIGNIFICANT CHANGE UP (ref 1.6–2.6)
MCHC RBC-ENTMCNC: 28.8 PG — SIGNIFICANT CHANGE UP (ref 27–34)
MCHC RBC-ENTMCNC: 31.5 G/DL — LOW (ref 32–36)
MCV RBC AUTO: 91.4 FL — SIGNIFICANT CHANGE UP (ref 80–100)
NRBC BLD AUTO-RTO: 0 /100 WBCS — SIGNIFICANT CHANGE UP (ref 0–0)
PHOSPHATE SERPL-MCNC: 2.3 MG/DL — LOW (ref 2.5–4.5)
PLATELET # BLD AUTO: 214 K/UL — SIGNIFICANT CHANGE UP (ref 150–400)
POTASSIUM SERPL-MCNC: 3.9 MMOL/L — SIGNIFICANT CHANGE UP (ref 3.5–5.3)
POTASSIUM SERPL-SCNC: 3.9 MMOL/L — SIGNIFICANT CHANGE UP (ref 3.5–5.3)
RBC # BLD: 3.02 M/UL — LOW (ref 3.8–5.2)
RBC # FLD: 17.9 % — HIGH (ref 10.3–14.5)
SODIUM SERPL-SCNC: 137 MMOL/L — SIGNIFICANT CHANGE UP (ref 135–145)
WBC # BLD: 9.01 K/UL — SIGNIFICANT CHANGE UP (ref 3.8–10.5)
WBC # FLD AUTO: 9.01 K/UL — SIGNIFICANT CHANGE UP (ref 3.8–10.5)

## 2025-06-01 PROCEDURE — 99233 SBSQ HOSP IP/OBS HIGH 50: CPT

## 2025-06-01 PROCEDURE — 99254 IP/OBS CNSLTJ NEW/EST MOD 60: CPT

## 2025-06-01 RX ORDER — ACETAMINOPHEN 500 MG/5ML
1000 LIQUID (ML) ORAL EVERY 6 HOURS
Refills: 0 | Status: DISCONTINUED | OUTPATIENT
Start: 2025-06-01 | End: 2025-06-05

## 2025-06-01 RX ORDER — METOPROLOL SUCCINATE 50 MG/1
25 TABLET, EXTENDED RELEASE ORAL DAILY
Refills: 0 | Status: DISCONTINUED | OUTPATIENT
Start: 2025-06-01 | End: 2025-06-03

## 2025-06-01 RX ORDER — METOPROLOL SUCCINATE 50 MG/1
2.5 TABLET, EXTENDED RELEASE ORAL EVERY 6 HOURS
Refills: 0 | Status: DISCONTINUED | OUTPATIENT
Start: 2025-06-01 | End: 2025-06-01

## 2025-06-01 RX ORDER — POLYETHYLENE GLYCOL 3350 17 G/17G
17 POWDER, FOR SOLUTION ORAL DAILY
Refills: 0 | Status: DISCONTINUED | OUTPATIENT
Start: 2025-06-01 | End: 2025-06-05

## 2025-06-01 RX ORDER — DEXTROSE 50 % IN WATER 50 %
25 SYRINGE (ML) INTRAVENOUS ONCE
Refills: 0 | Status: COMPLETED | OUTPATIENT
Start: 2025-06-01 | End: 2025-06-01

## 2025-06-01 RX ORDER — OXYCODONE HYDROCHLORIDE 30 MG/1
2.5 TABLET ORAL EVERY 6 HOURS
Refills: 0 | Status: DISCONTINUED | OUTPATIENT
Start: 2025-06-01 | End: 2025-06-04

## 2025-06-01 RX ORDER — HALOPERIDOL 10 MG/1
0.5 TABLET ORAL ONCE
Refills: 0 | Status: DISCONTINUED | OUTPATIENT
Start: 2025-06-01 | End: 2025-06-05

## 2025-06-01 RX ORDER — MELATONIN 5 MG
3 TABLET ORAL AT BEDTIME
Refills: 0 | Status: DISCONTINUED | OUTPATIENT
Start: 2025-05-31 | End: 2025-06-05

## 2025-06-01 RX ORDER — OXYCODONE HYDROCHLORIDE 30 MG/1
5 TABLET ORAL EVERY 6 HOURS
Refills: 0 | Status: DISCONTINUED | OUTPATIENT
Start: 2025-06-01 | End: 2025-06-04

## 2025-06-01 RX ORDER — BISACODYL 5 MG
10 TABLET, DELAYED RELEASE (ENTERIC COATED) ORAL ONCE
Refills: 0 | Status: COMPLETED | OUTPATIENT
Start: 2025-06-01 | End: 2025-06-01

## 2025-06-01 RX ORDER — SENNA 187 MG
2 TABLET ORAL AT BEDTIME
Refills: 0 | Status: DISCONTINUED | OUTPATIENT
Start: 2025-06-01 | End: 2025-06-05

## 2025-06-01 RX ORDER — SODIUM PHOSPHATE,DIBASIC DIHYD
30 POWDER (GRAM) MISCELLANEOUS ONCE
Refills: 0 | Status: COMPLETED | OUTPATIENT
Start: 2025-06-01 | End: 2025-06-01

## 2025-06-01 RX ORDER — DEXTROSE 50 % IN WATER 50 %
12.5 SYRINGE (ML) INTRAVENOUS ONCE
Refills: 0 | Status: DISCONTINUED | OUTPATIENT
Start: 2025-06-01 | End: 2025-06-01

## 2025-06-01 RX ORDER — APIXABAN 2.5 MG/1
2.5 TABLET, FILM COATED ORAL EVERY 12 HOURS
Refills: 0 | Status: DISCONTINUED | OUTPATIENT
Start: 2025-06-01 | End: 2025-06-05

## 2025-06-01 RX ADMIN — Medication 10 MILLIGRAM(S): at 10:13

## 2025-06-01 RX ADMIN — Medication 40 MILLIGRAM(S): at 23:49

## 2025-06-01 RX ADMIN — METOPROLOL SUCCINATE 2.5 MILLIGRAM(S): 50 TABLET, EXTENDED RELEASE ORAL at 12:15

## 2025-06-01 RX ADMIN — POLYETHYLENE GLYCOL 3350 17 GRAM(S): 17 POWDER, FOR SOLUTION ORAL at 12:17

## 2025-06-01 RX ADMIN — Medication 40 MILLIGRAM(S): at 00:27

## 2025-06-01 RX ADMIN — METOPROLOL SUCCINATE 25 MILLIGRAM(S): 50 TABLET, EXTENDED RELEASE ORAL at 15:41

## 2025-06-01 RX ADMIN — Medication 25 GRAM(S): at 06:52

## 2025-06-01 RX ADMIN — APIXABAN 2.5 MILLIGRAM(S): 2.5 TABLET, FILM COATED ORAL at 17:49

## 2025-06-01 RX ADMIN — Medication 85 MILLIMOLE(S): at 10:14

## 2025-06-01 RX ADMIN — Medication 25 GRAM(S): at 01:40

## 2025-06-01 RX ADMIN — METOPROLOL SUCCINATE 2.5 MILLIGRAM(S): 50 TABLET, EXTENDED RELEASE ORAL at 06:52

## 2025-06-01 RX ADMIN — Medication 40 MILLIGRAM(S): at 12:16

## 2025-06-01 NOTE — PROGRESS NOTE ADULT - SUBJECTIVE AND OBJECTIVE BOX
INTERVAL EVENTS: no acute events or complaints    PAST MEDICAL & SURGICAL HISTORY:  No pertinent past medical history        MEDICATIONS  (STANDING):  apixaban 2.5 milliGRAM(s) Oral every 12 hours  bisacodyl Suppository 10 milliGRAM(s) Rectal once  metoprolol tartrate Injectable 2.5 milliGRAM(s) IV Push every 6 hours  pantoprazole  Injectable 40 milliGRAM(s) IV Push every 12 hours  polyethylene glycol 3350 17 Gram(s) Oral daily  senna 2 Tablet(s) Oral at bedtime  sodium phosphate 30 milliMole(s)/500 mL IVPB 30 milliMole(s) IV Intermittent once    MEDICATIONS  (PRN):  HYDROmorphone  Injectable 0.5 milliGRAM(s) IV Push every 6 hours PRN Moderate Pain (4 - 6)  ondansetron Injectable 4 milliGRAM(s) IV Push every 6 hours PRN Nausea and/or Vomiting    T(F): 98.3 (06-01-25 @ 08:53), Max: 98.5 (05-31-25 @ 21:50)  HR: 80 (06-01-25 @ 08:31) (76 - 160)  BP: 123/57 (06-01-25 @ 08:31) (106/57 - 127/57)  BP(mean): 82 (06-01-25 @ 08:31) (75 - 88)  ABP: --  ABP(mean): --  RR: 18 (06-01-25 @ 08:31) (16 - 18)  SpO2: 97% (06-01-25 @ 08:31) (97% - 100%)  CVP(mm Hg): --    I/O Detail 24H    31 May 2025 07:01  -  01 Jun 2025 07:00  --------------------------------------------------------  IN:    IV PiggyBack: 550 mL    Lactated Ringers: 1200 mL  Total IN: 1750 mL    OUT:    Voided (mL): 1750 mL  Total OUT: 1750 mL    Total NET: 0 mL      01 Jun 2025 07:01  -  01 Jun 2025 09:46  --------------------------------------------------------  IN:    Lactated Ringers: 100 mL  Total IN: 100 mL    OUT:    Voided (mL): 700 mL  Total OUT: 700 mL    Total NET: -600 mL          PHYSICAL EXAM:  General: sleeping, open eyes to gentle touch, looking comfortable, no labored breathing on RA  Lungs: limited, no crackles, no wheezes  Heart: regular + murmur   Abdomen: patient positioning limiting exam, soft, no visible tenderness  Extremities: Contracted, warm, no visible edema. Elizondo in place draining clear urine     LABS:  CBC 06-01-25 @ 06:43                        8.7    9.01  )-----------( 214                   27.6     Hgb trend: 8.7 <-- , 7.9 <-- , 7.9 <-- , 6.3 <-- , 7.1 <--   WBC trend: 9.01 <-- , 10.34 <-- , 10.85 <-- , 11.81 <-- , 12.56 <--       CMP 06-01-25 @ 06:43    137  |  108  |  7   ----------------------------<  61[L]  3.9   |  20[L]  |  0.48[L]    Ca    7.5[L]      06-01-25 @ 06:43  Phos  2.3     06-01  Mg     2.2     06-01      Serum Cr (eGFR) trend: 0.48 (95) <-- , 0.52 (93) <-- , 0.56 (92) <--           Cardiac Markers         STUDIES:

## 2025-06-01 NOTE — PROGRESS NOTE ADULT - ASSESSMENT
80F w/ dementia, CVA w/ LT hemiplegia,admitted for findings of SBO 2/2 incarcerated loop of ileum within a LT femoral hernia and incidental finding of anemia with possible met ascending colon cancer on CT. Now s/p open left femoral hernia repair with mesh, dx laparoscopy w/ healthy bowel (5/29).     NPO w/ sips/IVF  CTX (5/29-6/1) for +UA  Pain/Nausea PRN  PPI BID   GI recs   Holding SQH (anemia)   Bedbound/IS/SCDs  AM Labs 80F w/ dementia, CVA w/ LT hemiplegia,admitted for findings of SBO 2/2 incarcerated loop of ileum within a LT femoral hernia and incidental finding of anemia with possible met ascending colon cancer on CT. Now s/p open left femoral hernia repair with mesh, dx laparoscopy w/ healthy bowel (5/29).     Reg  CTX (5/29-6/1) for +UA  Pain/Nausea PRN  PPI BID   GI recs   eliquis 2.5 BID  bowel regimen  Holding SQH (anemia)   Bedbound/IS/SCDs  AM Labs

## 2025-06-01 NOTE — PROGRESS NOTE ADULT - ASSESSMENT
81 y/o F with above PMH presents with SBO    SBO secondary femoral hernia  S/p hernia repair with mesh  Management per primary team, on regular diet now   Per [facility], family not interested in invasive procedures     Anemia normocytic, acute on chronic   Patient with progressive drop in Hb since April around 10   No reported bleeding, has been on AC for Afib  Continue IV PPI  Continue to monitor Hb, target Hb 7-8,    Chronic Afib  Rate controlled, on Carvedilol at rehab.   Started on IV lopressor 2.5 mg q6, rates controlled  Restarted eliquis 2.5mg BID    Positive UA  Patient unable to provide history, will treat as UTI   Continue Ceftriaxone 2G, Follow-up urine culture  TOV per primary team     Dementia  CVA with residual deficit  Bedbound  Care per nursing protocol    Dispo: [facility]

## 2025-06-01 NOTE — PROGRESS NOTE ADULT - SUBJECTIVE AND OBJECTIVE BOX
INTERVAL HPI/OVERNIGHT EVENTS: lopressor 2.5 q6 per cards, glucose 62, dextrose 25g, glucose 171     STATUS POST:  5/29: s/p open L femoral hernia repair with mesh, dx laparoscopy     POST OPERATIVE DAY #: 3    SUBJECTIVE: Pt     MEDICATIONS  (STANDING):  lactated ringers. 1000 milliLiter(s) (50 mL/Hr) IV Continuous <Continuous>  metoprolol tartrate Injectable 2.5 milliGRAM(s) IV Push every 6 hours  pantoprazole  Injectable 40 milliGRAM(s) IV Push every 12 hours    MEDICATIONS  (PRN):  HYDROmorphone  Injectable 0.5 milliGRAM(s) IV Push every 6 hours PRN Moderate Pain (4 - 6)  ondansetron Injectable 4 milliGRAM(s) IV Push every 6 hours PRN Nausea and/or Vomiting      Vital Signs Last 24 Hrs  T(C): 36.8 (01 Jun 2025 04:58), Max: 37.1 (31 May 2025 09:15)  T(F): 98.2 (01 Jun 2025 04:58), Max: 98.8 (31 May 2025 09:15)  HR: 76 (01 Jun 2025 00:35) (76 - 160)  BP: 111/60 (01 Jun 2025 00:35) (106/57 - 125/61)  BP(mean): 78 (01 Jun 2025 00:35) (75 - 88)  RR: 17 (01 Jun 2025 00:35) (16 - 18)  SpO2: 99% (01 Jun 2025 00:35) (98% - 100%)    Parameters below as of 01 Jun 2025 00:35  Patient On (Oxygen Delivery Method): room air        PHYSICAL EXAM:      Constitutional: A&Ox3, nad  Respiratory: non labored breathing, no respiratory distress  Cardiovascular: NSR, RRR  Gastrointestinal:                 Incision:  Genitourinary: voiding  Extremities: (-) edema, wwp, SCDs in place                I&O's Detail    30 May 2025 07:01  -  31 May 2025 07:00  --------------------------------------------------------  IN:    IV PiggyBack: 200 mL    Lactated Ringers: 540 mL    Lactated Ringers: 700 mL  Total IN: 1440 mL    OUT:    Indwelling Catheter - Urethral (mL): 175 mL    Voided (mL): 600 mL  Total OUT: 775 mL    Total NET: 665 mL      31 May 2025 07:01  -  01 Jun 2025 06:32  --------------------------------------------------------  IN:    IV PiggyBack: 550 mL    Lactated Ringers: 950 mL  Total IN: 1500 mL    OUT:    Voided (mL): 1350 mL  Total OUT: 1350 mL    Total NET: 150 mL          LABS:                        7.9    10.34 )-----------( 203      ( 31 May 2025 05:30 )             24.3     05-31    139  |  108  |  11  ----------------------------<  73  3.8   |  21[L]  |  0.52    Ca    7.8[L]      31 May 2025 05:30  Phos  2.3     05-31  Mg     1.7     05-31        Urinalysis Basic - ( 31 May 2025 05:30 )    Color: x / Appearance: x / SG: x / pH: x  Gluc: 73 mg/dL / Ketone: x  / Bili: x / Urobili: x   Blood: x / Protein: x / Nitrite: x   Leuk Esterase: x / RBC: x / WBC x   Sq Epi: x / Non Sq Epi: x / Bacteria: x        RADIOLOGY & ADDITIONAL STUDIES: INTERVAL HPI/OVERNIGHT EVENTS: lopressor 2.5 q6 per cards, glucose 62, dextrose 25g, glucose 171     STATUS POST:  5/29: s/p open L femoral hernia repair with mesh, dx laparoscopy     POST OPERATIVE DAY #: 3    SUBJECTIVE: Pt reports doing well, no pain. Denies any nausea, vomiting, or bowel function.    MEDICATIONS  (STANDING):  lactated ringers. 1000 milliLiter(s) (50 mL/Hr) IV Continuous <Continuous>  metoprolol tartrate Injectable 2.5 milliGRAM(s) IV Push every 6 hours  pantoprazole  Injectable 40 milliGRAM(s) IV Push every 12 hours    MEDICATIONS  (PRN):  HYDROmorphone  Injectable 0.5 milliGRAM(s) IV Push every 6 hours PRN Moderate Pain (4 - 6)  ondansetron Injectable 4 milliGRAM(s) IV Push every 6 hours PRN Nausea and/or Vomiting      Vital Signs Last 24 Hrs  T(C): 36.8 (01 Jun 2025 04:58), Max: 37.1 (31 May 2025 09:15)  T(F): 98.2 (01 Jun 2025 04:58), Max: 98.8 (31 May 2025 09:15)  HR: 76 (01 Jun 2025 00:35) (76 - 160)  BP: 111/60 (01 Jun 2025 00:35) (106/57 - 125/61)  BP(mean): 78 (01 Jun 2025 00:35) (75 - 88)  RR: 17 (01 Jun 2025 00:35) (16 - 18)  SpO2: 99% (01 Jun 2025 00:35) (98% - 100%)    Parameters below as of 01 Jun 2025 00:35  Patient On (Oxygen Delivery Method): room air        PHYSICAL EXAM:      Constitutional: A&Ox3, nad  Respiratory: non labored breathing, no respiratory distress  Cardiovascular: NSR, RRR  Gastrointestinal: abd soft, midlly TTP in R abd, ND                 Incision: C/D/I  Genitourinary: voiding  Extremities: (-) edema, wwp, SCDs in place                I&O's Detail    30 May 2025 07:01  -  31 May 2025 07:00  --------------------------------------------------------  IN:    IV PiggyBack: 200 mL    Lactated Ringers: 540 mL    Lactated Ringers: 700 mL  Total IN: 1440 mL    OUT:    Indwelling Catheter - Urethral (mL): 175 mL    Voided (mL): 600 mL  Total OUT: 775 mL    Total NET: 665 mL      31 May 2025 07:01  -  01 Jun 2025 06:32  --------------------------------------------------------  IN:    IV PiggyBack: 550 mL    Lactated Ringers: 950 mL  Total IN: 1500 mL    OUT:    Voided (mL): 1350 mL  Total OUT: 1350 mL    Total NET: 150 mL          LABS:                        7.9    10.34 )-----------( 203      ( 31 May 2025 05:30 )             24.3     05-31    139  |  108  |  11  ----------------------------<  73  3.8   |  21[L]  |  0.52    Ca    7.8[L]      31 May 2025 05:30  Phos  2.3     05-31  Mg     1.7     05-31        Urinalysis Basic - ( 31 May 2025 05:30 )    Color: x / Appearance: x / SG: x / pH: x  Gluc: 73 mg/dL / Ketone: x  / Bili: x / Urobili: x   Blood: x / Protein: x / Nitrite: x   Leuk Esterase: x / RBC: x / WBC x   Sq Epi: x / Non Sq Epi: x / Bacteria: x        RADIOLOGY & ADDITIONAL STUDIES:

## 2025-06-02 LAB
ANION GAP SERPL CALC-SCNC: 11 MMOL/L — SIGNIFICANT CHANGE UP (ref 5–17)
BUN SERPL-MCNC: 4 MG/DL — LOW (ref 7–23)
CALCIUM SERPL-MCNC: 7.5 MG/DL — LOW (ref 8.4–10.5)
CHLORIDE SERPL-SCNC: 108 MMOL/L — SIGNIFICANT CHANGE UP (ref 96–108)
CO2 SERPL-SCNC: 16 MMOL/L — LOW (ref 22–31)
CREAT SERPL-MCNC: 0.43 MG/DL — LOW (ref 0.5–1.3)
EGFR: 98 ML/MIN/1.73M2 — SIGNIFICANT CHANGE UP
EGFR: 98 ML/MIN/1.73M2 — SIGNIFICANT CHANGE UP
GLUCOSE SERPL-MCNC: 74 MG/DL — SIGNIFICANT CHANGE UP (ref 70–99)
HCT VFR BLD CALC: 28.7 % — LOW (ref 34.5–45)
HGB BLD-MCNC: 9.3 G/DL — LOW (ref 11.5–15.5)
MAGNESIUM SERPL-MCNC: 1.9 MG/DL — SIGNIFICANT CHANGE UP (ref 1.6–2.6)
MCHC RBC-ENTMCNC: 29.5 PG — SIGNIFICANT CHANGE UP (ref 27–34)
MCHC RBC-ENTMCNC: 32.4 G/DL — SIGNIFICANT CHANGE UP (ref 32–36)
MCV RBC AUTO: 91.1 FL — SIGNIFICANT CHANGE UP (ref 80–100)
NRBC BLD AUTO-RTO: 0 /100 WBCS — SIGNIFICANT CHANGE UP (ref 0–0)
PHOSPHATE SERPL-MCNC: 2.9 MG/DL — SIGNIFICANT CHANGE UP (ref 2.5–4.5)
PLATELET # BLD AUTO: 220 K/UL — SIGNIFICANT CHANGE UP (ref 150–400)
POTASSIUM SERPL-MCNC: 4 MMOL/L — SIGNIFICANT CHANGE UP (ref 3.5–5.3)
POTASSIUM SERPL-SCNC: 4 MMOL/L — SIGNIFICANT CHANGE UP (ref 3.5–5.3)
RBC # BLD: 3.15 M/UL — LOW (ref 3.8–5.2)
RBC # FLD: 17.4 % — HIGH (ref 10.3–14.5)
SODIUM SERPL-SCNC: 135 MMOL/L — SIGNIFICANT CHANGE UP (ref 135–145)
WBC # BLD: 10.38 K/UL — SIGNIFICANT CHANGE UP (ref 3.8–10.5)
WBC # FLD AUTO: 10.38 K/UL — SIGNIFICANT CHANGE UP (ref 3.8–10.5)

## 2025-06-02 PROCEDURE — 99233 SBSQ HOSP IP/OBS HIGH 50: CPT

## 2025-06-02 PROCEDURE — 93308 TTE F-UP OR LMTD: CPT | Mod: 26

## 2025-06-02 RX ORDER — IRON SUCROSE 20 MG/ML
200 INJECTION, SOLUTION INTRAVENOUS ONCE
Refills: 0 | Status: COMPLETED | OUTPATIENT
Start: 2025-06-02 | End: 2025-06-02

## 2025-06-02 RX ADMIN — Medication 3 MILLIGRAM(S): at 00:01

## 2025-06-02 RX ADMIN — METOPROLOL SUCCINATE 25 MILLIGRAM(S): 50 TABLET, EXTENDED RELEASE ORAL at 06:26

## 2025-06-02 RX ADMIN — OXYCODONE HYDROCHLORIDE 5 MILLIGRAM(S): 30 TABLET ORAL at 13:51

## 2025-06-02 RX ADMIN — OXYCODONE HYDROCHLORIDE 5 MILLIGRAM(S): 30 TABLET ORAL at 12:50

## 2025-06-02 RX ADMIN — Medication 40 MILLIGRAM(S): at 23:59

## 2025-06-02 RX ADMIN — APIXABAN 2.5 MILLIGRAM(S): 2.5 TABLET, FILM COATED ORAL at 17:42

## 2025-06-02 RX ADMIN — IRON SUCROSE 110 MILLIGRAM(S): 20 INJECTION, SOLUTION INTRAVENOUS at 12:50

## 2025-06-02 RX ADMIN — Medication 40 MILLIGRAM(S): at 12:51

## 2025-06-02 RX ADMIN — APIXABAN 2.5 MILLIGRAM(S): 2.5 TABLET, FILM COATED ORAL at 06:26

## 2025-06-02 NOTE — PROGRESS NOTE ADULT - SUBJECTIVE AND OBJECTIVE BOX
SUBJECTIVE:  Patient was seen and examined at bedside. Patient with Afib with RVR, stepped up to Telemetry. Patient evaluated at the bedside, denies chest pain, no dizziness, no SOB, reports left knee pain. Denies other complaints. Aid at the bedside. No other complaints or events reported.    Review of systems: Review of systems negative unless otherwise documented elsewhere in note.     Diet, Regular:   Pureed (PUREED) (06-01-25 @ 10:33) [Active]      MEDICATIONS:  MEDICATIONS  (STANDING):  apixaban 2.5 milliGRAM(s) Oral every 12 hours  digoxin  Injectable 250 MICROGram(s) IV Push once  melatonin 3 milliGRAM(s) Oral at bedtime  metoprolol succinate ER 25 milliGRAM(s) Oral daily  pantoprazole  Injectable 40 milliGRAM(s) IV Push every 12 hours  polyethylene glycol 3350 17 Gram(s) Oral daily  senna 2 Tablet(s) Oral at bedtime    MEDICATIONS  (PRN):  acetaminophen     Tablet .. 1000 milliGRAM(s) Oral every 6 hours PRN Mild Pain (1 - 3)  haloperidol    Injectable 0.5 milliGRAM(s) IntraMuscular once PRN Severe agitation secondary to psychosis, jacqueline, or poor impulse control  ondansetron Injectable 4 milliGRAM(s) IV Push every 6 hours PRN Nausea and/or Vomiting  oxyCODONE    IR 2.5 milliGRAM(s) Oral every 6 hours PRN Moderate Pain (4 - 6)  oxyCODONE    IR 5 milliGRAM(s) Oral every 6 hours PRN Severe Pain (7 - 10)      Allergies    No Known Allergies    Intolerances        OBJECTIVE:  Vital Signs Last 24 Hrs  T(C): 37.7 (03 Jun 2025 08:20), Max: 37.7 (03 Jun 2025 08:20)  T(F): 99.8 (03 Jun 2025 08:20), Max: 99.8 (03 Jun 2025 08:20)  HR: 132 (03 Jun 2025 09:40) (94 - 145)  BP: 103/75 (03 Jun 2025 09:40) (90/64 - 127/60)  BP(mean): 86 (03 Jun 2025 09:40) (76 - 86)  RR: 18 (03 Jun 2025 09:40) (17 - 19)  SpO2: 98% (03 Jun 2025 09:40) (96% - 100%)    Parameters below as of 03 Jun 2025 09:40  Patient On (Oxygen Delivery Method): room air      I&O's Summary    02 Jun 2025 07:01  -  03 Jun 2025 07:00  --------------------------------------------------------  IN: 500 mL / OUT: 550 mL / NET: -50 mL    03 Jun 2025 07:01  -  03 Jun 2025 11:15  --------------------------------------------------------  IN: 100 mL / OUT: 0 mL / NET: 100 mL        PHYSICAL EXAM:  General: awake, alert, NAD, no labored breathing on RA. speaking in full sentences  HEENT: AT/NC  Lungs: limited, no crackles, no wheezes  Heart: irregular, tachycardic  Abdomen: soft, no tenderness to palpation  Extremities: warm, left knee tenderness, no erythema, no warmth, no swelling.     LABS:                        9.1    11.42 )-----------( 278      ( 03 Jun 2025 06:25 )             28.7     06-03    141  |  112[H]  |  5[L]  ----------------------------<  74  3.9   |  19[L]  |  0.49[L]    Ca    7.4[L]      03 Jun 2025 06:25  Phos  2.6     06-03  Mg     1.8     06-03          CAPILLARY BLOOD GLUCOSE      POCT Blood Glucose.: 83 mg/dL (02 Jun 2025 11:45)    Urinalysis Basic - ( 03 Jun 2025 06:25 )    Color: x / Appearance: x / SG: x / pH: x  Gluc: 74 mg/dL / Ketone: x  / Bili: x / Urobili: x   Blood: x / Protein: x / Nitrite: x   Leuk Esterase: x / RBC: x / WBC x   Sq Epi: x / Non Sq Epi: x / Bacteria: x        MICRODATA:      RADIOLOGY/OTHER STUDIES:

## 2025-06-02 NOTE — PROGRESS NOTE ADULT - SUBJECTIVE AND OBJECTIVE BOX
***incomplete***  [facility]iatric[facility] and Pall[facility]  Juliette Alvarado, Geriatrics & Palliative Care NP  Contact Info: Call [phone] (HEAL Line) or message on [employer] Teams    SUBJECTIVE/OBJECTIVE:    ALLERGIES: No Known Allergies      MEDICATIONS: REVIEWED  MEDICATIONS  (STANDING):  apixaban 2.5 milliGRAM(s) Oral every 12 hours  melatonin 3 milliGRAM(s) Oral at bedtime  metoprolol succinate ER 25 milliGRAM(s) Oral daily  pantoprazole  Injectable 40 milliGRAM(s) IV Push every 12 hours  polyethylene glycol 3350 17 Gram(s) Oral daily  senna 2 Tablet(s) Oral at bedtime    MEDICATIONS  (PRN):  acetaminophen     Tablet .. 1000 milliGRAM(s) Oral every 6 hours PRN Mild Pain (1 - 3)  haloperidol    Injectable 0.5 milliGRAM(s) IntraMuscular once PRN Severe agitation secondary to psychosis, jacqueline, or poor impulse control  ondansetron Injectable 4 milliGRAM(s) IV Push every 6 hours PRN Nausea and/or Vomiting  oxyCODONE    IR 2.5 milliGRAM(s) Oral every 6 hours PRN Moderate Pain (4 - 6)  oxyCODONE    IR 5 milliGRAM(s) Oral every 6 hours PRN Severe Pain (7 - 10)        Analgesic Use (Scheduled and PRNs) for past 24 hours (6a-6a):      melatonin   3 milliGRAM(s) Oral (06-02-25 @ 00:01)    oxyCODONE    IR   5 milliGRAM(s) Oral (06-02-25 @ 12:50)          T(C): 36.9 (06-02-25 @ 14:07), Max: 36.9 (06-02-25 @ 14:07)  HR: 104 (06-02-25 @ 12:50) (88 - 104)  BP: 127/60 (06-02-25 @ 12:50) (101/59 - 130/61)  RR: 18 (06-02-25 @ 12:50) (16 - 18)  SpO2: 99% (06-02-25 @ 12:50) (96% - 100%)  Wt(kg): --    CRITICAL CARE:  [ ]Shock Present  [ ]Septic [ ]Cardiogenic [ ]Neurologic [ ]Hypovolemic    [ ]Vasopressors [ ]Inotropes    [ ]Respiratory failure present   [ ]Mechanical Ventilation   [  ]Non-invasive ventilatory support   [ ]High-Flow  [ ]Acute  [ ]Chronic   [ ]Hypoxic  [ ]Hypercarbic   [ ]Other  [ ]Other organ failure     LABS: REVIEWED  CBC:                        9.3    10.38 )-----------( 220      ( 02 Jun 2025 07:40 )             28.7     CMP:    06-02    135  |  108  |  4[L]  ----------------------------<  74  4.0   |  16[L]  |  0.43[L]    Ca    7.5[L]      02 Jun 2025 07:40  Phos  2.9     06-02  Mg     1.9     06-02        RADIOLOGY & ADDITIONAL STUDIES:     DISCUSSION OF CASE:    CARE COORDINATION:      [facility]iatric[facility] and Palliative [facility]  Juliette Alvarado, Geriatrics & Palliative Care NP  Contact Info: Call [phone] (HEAL Line) or message on Microsoft Teams    SUBJECTIVE/OBJECTIVE:  Interval events reviewed. Patient seen and examined at bedside. Apprears comfortable.    ALLERGIES: No Known Allergies      MEDICATIONS: REVIEWED  MEDICATIONS  (STANDING):  apixaban 2.5 milliGRAM(s) Oral every 12 hours  melatonin 3 milliGRAM(s) Oral at bedtime  metoprolol succinate ER 25 milliGRAM(s) Oral daily  pantoprazole  Injectable 40 milliGRAM(s) IV Push every 12 hours  polyethylene glycol 3350 17 Gram(s) Oral daily  senna 2 Tablet(s) Oral at bedtime    MEDICATIONS  (PRN):  acetaminophen     Tablet .. 1000 milliGRAM(s) Oral every 6 hours PRN Mild Pain (1 - 3)  haloperidol    Injectable 0.5 milliGRAM(s) IntraMuscular once PRN Severe agitation secondary to psychosis, jacqueline, or poor impulse control  ondansetron Injectable 4 milliGRAM(s) IV Push every 6 hours PRN Nausea and/or Vomiting  oxyCODONE    IR 2.5 milliGRAM(s) Oral every 6 hours PRN Moderate Pain (4 - 6)  oxyCODONE    IR 5 milliGRAM(s) Oral every 6 hours PRN Severe Pain (7 - 10)        Analgesic Use (Scheduled and PRNs) for past 24 hours (6a-6a):      melatonin   3 milliGRAM(s) Oral (06-02-25 @ 00:01)    oxyCODONE    IR   5 milliGRAM(s) Oral (06-02-25 @ 12:50)    PRESENT SYMPTOMS:   [ ] Patient unable to self report   Source if other than patient:  [ ]Family   [ ]Team     Pain [  ] denies  Location :        Quality:  Radiation:  Timing:  Aggravating factors:  Minimal acceptable level (0-10 scale):   Severity in last 24h (0-10 scale) :  Current score (0-10 scale):  Improves with:     PAIN AD Score:   [email or web address] (press ctrl +  left click to view)    If [  ], pt denies symptom.   Dyspnea:         [  ]  Anxiety:           [  ]  Difficulty sleeping: [  ]  Fatigue:           [  ]  Nausea:           [  ]  Loss of appetite:     [  ]  Dysphagia: [  ]  Constipation:   [  ]        LBM   Other Symptoms:    All other review of systems negative [ x ]    ECOG Performance:       Current Palliative Performance Scale/Karnofsky Score: 20-30   %  Preadmit Karnofsky:  30-40 %          PEx:  General: alert  oriented x 2 (self, hospital), minimally verbal  Behavioral: calm  HEENT: atraumatic,  No dry mouth  RESP: Reg rhythm, No  tachypnea/labored breathing,  No audible excessive secretions  CV: RRR, S1S2,  No  tachycardia  GI: soft non distended non tender   :  incontinent    MUSK: weakness x4,  edema, bed bound  SKIN:  Poor skin turgor, Pallor, Pressure ulcer stage: II, L malleolus  NEURO: cognitive impairment  Oral intake ability:  minimal capability        T(C): 36.9 (06-02-25 @ 14:07), Max: 36.9 (06-02-25 @ 14:07)  HR: 104 (06-02-25 @ 12:50) (88 - 104)  BP: 127/60 (06-02-25 @ 12:50) (101/59 - 130/61)  RR: 18 (06-02-25 @ 12:50) (16 - 18)  SpO2: 99% (06-02-25 @ 12:50) (96% - 100%)  Wt(kg): --      LABS: REVIEWED  CBC:                        9.3    10.38 )-----------( 220      ( 02 Jun 2025 07:40 )             28.7     CMP:    06-02    135  |  108  |  4[L]  ----------------------------<  74  4.0   |  16[L]  |  0.43[L]    Ca    7.5[L]      02 Jun 2025 07:40  Phos  2.9     06-02  Mg     1.9     06-02        RADIOLOGY & ADDITIONAL STUDIES:     DISCUSSION OF CASE:    CARE COORDINATION:

## 2025-06-02 NOTE — PROGRESS NOTE ADULT - SUBJECTIVE AND OBJECTIVE BOX
SUBJECTIVE / INTERVAL HPI: Patient seen and examined at bedside. Says shes in a lot of pain in her feet, feels like they are "on fire." Denies any CP, SOB, lightheadedness or dizziness. Follows with a cardiologist but does not remember their name. Denies history of MI, CAD or cardiac arrythmia. Does not know what medications she takes at home.      PHYSICAL EXAM:    General: elderly female lying in bed, contracted towards left side , NAD  HEENT: NC/AT   Neck: supple, no JVD  Cardiovascular: +S1/S2, irregular rhythm, tachycardic rate, +systolic murmur  Respiratory: CTA B/L   Extremities: WWP; no LE edema      VITAL SIGNS:  Vital Signs Last 24 Hrs  T(C): 36.8 (02 Jun 2025 08:48), Max: 37.1 (01 Jun 2025 13:50)  T(F): 98.2 (02 Jun 2025 08:48), Max: 98.7 (01 Jun 2025 13:50)  HR: 94 (02 Jun 2025 08:39) (88 - 100)  BP: 106/78 (02 Jun 2025 08:39) (101/59 - 130/61)  BP(mean): 85 (02 Jun 2025 08:39) (72 - 89)  RR: 18 (02 Jun 2025 08:39) (16 - 18)  SpO2: 97% (02 Jun 2025 08:39) (92% - 100%)    Parameters below as of 02 Jun 2025 08:39  Patient On (Oxygen Delivery Method): room air          MEDICATIONS:  MEDICATIONS  (STANDING):  apixaban 2.5 milliGRAM(s) Oral every 12 hours  melatonin 3 milliGRAM(s) Oral at bedtime  metoprolol succinate ER 25 milliGRAM(s) Oral daily  pantoprazole  Injectable 40 milliGRAM(s) IV Push every 12 hours  polyethylene glycol 3350 17 Gram(s) Oral daily  senna 2 Tablet(s) Oral at bedtime    MEDICATIONS  (PRN):  acetaminophen     Tablet .. 1000 milliGRAM(s) Oral every 6 hours PRN Mild Pain (1 - 3)  haloperidol    Injectable 0.5 milliGRAM(s) IntraMuscular once PRN Severe agitation secondary to psychosis, jacqueline, or poor impulse control  ondansetron Injectable 4 milliGRAM(s) IV Push every 6 hours PRN Nausea and/or Vomiting  oxyCODONE    IR 2.5 milliGRAM(s) Oral every 6 hours PRN Moderate Pain (4 - 6)  oxyCODONE    IR 5 milliGRAM(s) Oral every 6 hours PRN Severe Pain (7 - 10)      ALLERGIES:  Allergies    No Known Allergies    Intolerances        LABS:                        9.3    10.38 )-----------( 220      ( 02 Jun 2025 07:40 )             28.7     06-02    135  |  108  |  4[L]  ----------------------------<  74  4.0   |  16[L]  |  0.43[L]    Ca    7.5[L]      02 Jun 2025 07:40  Phos  2.9     06-02  Mg     1.9     06-02        Urinalysis Basic - ( 02 Jun 2025 07:40 )    Color: x / Appearance: x / SG: x / pH: x  Gluc: 74 mg/dL / Ketone: x  / Bili: x / Urobili: x   Blood: x / Protein: x / Nitrite: x   Leuk Esterase: x / RBC: x / WBC x   Sq Epi: x / Non Sq Epi: x / Bacteria: x      CAPILLARY BLOOD GLUCOSE      POCT Blood Glucose.: 83 mg/dL (02 Jun 2025 11:45)      RADIOLOGY & ADDITIONAL TESTS: Reviewed.

## 2025-06-02 NOTE — PROGRESS NOTE ADULT - SUBJECTIVE AND OBJECTIVE BOX
HX: POD s/p      SUBJECTIVE: Patient seen and examined bedside by chief resident. Patient refers feeling great this morning, only complaint is not liking the hospital food. Patient reports passing gas, having bowel movement, denies nausea, vomiting, CP, SOB.    apixaban 2.5 milliGRAM(s) Oral every 12 hours  metoprolol succinate ER 25 milliGRAM(s) Oral daily    MEDICATIONS  (PRN):  acetaminophen     Tablet .. 1000 milliGRAM(s) Oral every 6 hours PRN Mild Pain (1 - 3)  haloperidol    Injectable 0.5 milliGRAM(s) IntraMuscular once PRN Severe agitation secondary to psychosis, jacqueline, or poor impulse control  ondansetron Injectable 4 milliGRAM(s) IV Push every 6 hours PRN Nausea and/or Vomiting  oxyCODONE    IR 2.5 milliGRAM(s) Oral every 6 hours PRN Moderate Pain (4 - 6)  oxyCODONE    IR 5 milliGRAM(s) Oral every 6 hours PRN Severe Pain (7 - 10)      I&O's Detail    01 Jun 2025 07:01  -  02 Jun 2025 07:00  --------------------------------------------------------  IN:    IV PiggyBack: 499.8 mL    Lactated Ringers: 100 mL    Oral Fluid: 120 mL  Total IN: 719.8 mL    OUT:    Voided (mL): 1750 mL  Total OUT: 1750 mL    Total NET: -1030.2 mL          Vital Signs Last 24 Hrs  T(C): 36.8 (02 Jun 2025 04:59), Max: 37.1 (01 Jun 2025 13:50)  T(F): 98.2 (02 Jun 2025 04:59), Max: 98.7 (01 Jun 2025 13:50)  HR: 100 (02 Jun 2025 06:25) (80 - 100)  BP: 128/56 (02 Jun 2025 06:25) (101/59 - 130/61)  BP(mean): 81 (02 Jun 2025 06:25) (72 - 93)  RR: 16 (02 Jun 2025 06:25) (16 - 18)  SpO2: 100% (02 Jun 2025 06:25) (92% - 100%)    Parameters below as of 02 Jun 2025 06:25  Patient On (Oxygen Delivery Method): room air        PHYSICAL EXAM:   Gen: NAD, resting comfortably   CV: NSR  Pulm: no respiratory distress on RA, CTAB   Abd: Soft, ND, NTTP, no rebound or guarding. Incisions C/D/I.   Ext: WWP, no edema   Neuro: motor/sensory grossly intact     LABS:                        8.7    9.01  )-----------( 214      ( 01 Jun 2025 06:43 )             27.6     06-01    137  |  108  |  7   ----------------------------<  61[L]  3.9   |  20[L]  |  0.48[L]    Ca    7.5[L]      01 Jun 2025 06:43  Phos  2.3     06-01  Mg     2.2     06-01          CAPILLARY BLOOD GLUCOSE      POCT Blood Glucose.: 89 mg/dL (02 Jun 2025 06:19)  POCT Blood Glucose.: 100 mg/dL (02 Jun 2025 00:06)  POCT Blood Glucose.: 80 mg/dL (01 Jun 2025 22:31)  POCT Blood Glucose.: 89 mg/dL (01 Jun 2025 15:56)  POCT Blood Glucose.: 92 mg/dL (01 Jun 2025 11:06)  POCT Blood Glucose.: 139 mg/dL (01 Jun 2025 07:08)        Urinalysis Basic - ( 01 Jun 2025 06:43 )    Color: x / Appearance: x / SG: x / pH: x  Gluc: 61 mg/dL / Ketone: x  / Bili: x / Urobili: x   Blood: x / Protein: x / Nitrite: x   Leuk Esterase: x / RBC: x / WBC x   Sq Epi: x / Non Sq Epi: x / Bacteria: x        Urinalysis with Rflx Culture (collected 29 May 2025 13:29)    Culture - Urine (collected 29 May 2025 13:29)  Source: Clean Catch None  Final Report (30 May 2025 17:15):    >=3 organisms. Probable collection contamination.    Culture - Blood (collected 29 May 2025 09:22)  Source: Blood Blood-Peripheral  Preliminary Report (01 Jun 2025 19:01):    No growth at 72 Hours    Culture - Blood (collected 29 May 2025 09:22)  Source: Blood Blood-Peripheral  Preliminary Report (01 Jun 2025 19:01):    No growth at 72 Hours      HX: POD4 s/p  open left femoral hernia repair with mesh, dx laparoscopy w/ healthy bowel      SUBJECTIVE: Patient seen and examined bedside by chief resident. Patient refers feeling great this morning, only complaint is not liking the hospital food. Patient reports passing gas, having bowel movement, denies nausea, vomiting, CP, SOB.    apixaban 2.5 milliGRAM(s) Oral every 12 hours  metoprolol succinate ER 25 milliGRAM(s) Oral daily    MEDICATIONS  (PRN):  acetaminophen     Tablet .. 1000 milliGRAM(s) Oral every 6 hours PRN Mild Pain (1 - 3)  haloperidol    Injectable 0.5 milliGRAM(s) IntraMuscular once PRN Severe agitation secondary to psychosis, jacqueline, or poor impulse control  ondansetron Injectable 4 milliGRAM(s) IV Push every 6 hours PRN Nausea and/or Vomiting  oxyCODONE    IR 2.5 milliGRAM(s) Oral every 6 hours PRN Moderate Pain (4 - 6)  oxyCODONE    IR 5 milliGRAM(s) Oral every 6 hours PRN Severe Pain (7 - 10)      I&O's Detail    01 Jun 2025 07:01  -  02 Jun 2025 07:00  --------------------------------------------------------  IN:    IV PiggyBack: 499.8 mL    Lactated Ringers: 100 mL    Oral Fluid: 120 mL  Total IN: 719.8 mL    OUT:    Voided (mL): 1750 mL  Total OUT: 1750 mL    Total NET: -1030.2 mL          Vital Signs Last 24 Hrs  T(C): 36.8 (02 Jun 2025 04:59), Max: 37.1 (01 Jun 2025 13:50)  T(F): 98.2 (02 Jun 2025 04:59), Max: 98.7 (01 Jun 2025 13:50)  HR: 100 (02 Jun 2025 06:25) (80 - 100)  BP: 128/56 (02 Jun 2025 06:25) (101/59 - 130/61)  BP(mean): 81 (02 Jun 2025 06:25) (72 - 93)  RR: 16 (02 Jun 2025 06:25) (16 - 18)  SpO2: 100% (02 Jun 2025 06:25) (92% - 100%)    Parameters below as of 02 Jun 2025 06:25  Patient On (Oxygen Delivery Method): room air        PHYSICAL EXAM:   Gen: NAD, resting comfortably   CV: NSR  Pulm: no respiratory distress on RA, CTAB   Abd: Soft, ND, NTTP, no rebound or guarding. Incisions C/D/I.   Ext: WWP, no edema   Neuro: motor/sensory grossly intact     LABS:                        8.7    9.01  )-----------( 214      ( 01 Jun 2025 06:43 )             27.6     06-01    137  |  108  |  7   ----------------------------<  61[L]  3.9   |  20[L]  |  0.48[L]    Ca    7.5[L]      01 Jun 2025 06:43  Phos  2.3     06-01  Mg     2.2     06-01          CAPILLARY BLOOD GLUCOSE      POCT Blood Glucose.: 89 mg/dL (02 Jun 2025 06:19)  POCT Blood Glucose.: 100 mg/dL (02 Jun 2025 00:06)  POCT Blood Glucose.: 80 mg/dL (01 Jun 2025 22:31)  POCT Blood Glucose.: 89 mg/dL (01 Jun 2025 15:56)  POCT Blood Glucose.: 92 mg/dL (01 Jun 2025 11:06)  POCT Blood Glucose.: 139 mg/dL (01 Jun 2025 07:08)        Urinalysis Basic - ( 01 Jun 2025 06:43 )    Color: x / Appearance: x / SG: x / pH: x  Gluc: 61 mg/dL / Ketone: x  / Bili: x / Urobili: x   Blood: x / Protein: x / Nitrite: x   Leuk Esterase: x / RBC: x / WBC x   Sq Epi: x / Non Sq Epi: x / Bacteria: x        Urinalysis with Rflx Culture (collected 29 May 2025 13:29)    Culture - Urine (collected 29 May 2025 13:29)  Source: Clean Catch None  Final Report (30 May 2025 17:15):    >=3 organisms. Probable collection contamination.    Culture - Blood (collected 29 May 2025 09:22)  Source: Blood Blood-Peripheral  Preliminary Report (01 Jun 2025 19:01):    No growth at 72 Hours    Culture - Blood (collected 29 May 2025 09:22)  Source: Blood Blood-Peripheral  Preliminary Report (01 Jun 2025 19:01):    No growth at 72 Hours

## 2025-06-02 NOTE — PROGRESS NOTE ADULT - ASSESSMENT
79 y/o F with above PMH presents with SBO    SBO secondary femoral hernia  S/p hernia repair with mesh  Management per primary team, pain control per primary team. Can add Lidocaine patch to left knee.  Per GOC, family not interested in invasive procedures     Anemia normocytic, acute on chronic   Patient with progressive drop in Hb since April around 10  No reported bleeding, has been on AC for Afib, possible underlying colon malignancy. Family not pursuing further intervention.   AC resumed, Hb remains stable   Continue to monitor Hb, target Hb 7-8  Will get IV iron 200 mg for 3 days    Chronic Afib with RVR  Patient with rapid Afib this morning, clinically asymptomatic. Being loaded with Dig, TTE reviewed.   Management per Cardiology    Leucocytosis  Patient with uptrending WBC, no clear signs of infections  Continue to monitor, monitor left knee exam     Positive UA  Patient unable to provide history, will treat as UTI   Continue Ceftriaxone 2G, Follow-up urine culture  TOV per primary team     Dementia  CVA with residual deficit  Bedbound  Care per nursing protocol    DVT ppx: AC  Discussed with primary team

## 2025-06-02 NOTE — PROGRESS NOTE ADULT - ASSESSMENT
80F w/ dementia, CVA w/ LT hemiplegia,admitted for findings of SBO 2/2 incarcerated loop of ileum within a LT femoral hernia and incidental finding of anemia with possible met ascending colon cancer on CT. Now s/p open left femoral hernia repair with mesh, dx laparoscopy w/ healthy bowel (5/29).     Reg pureed  Pain/Nausea PRN  PPI BID   GI recs   Echo  Cards Recs  Eliquis  Bedbound/IS/SCDs  AM Labs      Plan discussed with attending and chief resident.   ____________________________________________________  Shelbi Love - Resident   Surgery

## 2025-06-02 NOTE — PROGRESS NOTE ADULT - ASSESSMENT
80F PMH dementia (AAOx1), CVA w/ LT hemiplegia & bedbound, PVD, HTN, HLD, atrial fibrillation (on Eliquis), initially sent in from [facility] with left groin pain. Found to have SBO with incarcerated loops of ileum within a left femoral hernia and likely colonic mass. Admitted to surgical service for further management now s/p L femoral hernia repair with mesh placement 5/29. Post op noted to have SVT on telemetry for which cardiology was consulted.    Review of studies:  TTE 6/2/25: Technically difficult image quality. Left ventricular cavity is small. Unable to evaluate left ventricular ejection fraction. Appears hyperdynamic but apical segments are not visualize. Normal right ventricular cavity size and normal right ventricular systolic function. Left atrium is dilated. There is severe calcification of the mitral valve annulus. Moderate mitral valve stenosis, secondary to dystrophic mitral annular calcification. Trileaflet aortic valve with reduced systolic excursion. There is severe calcification of the aortic valve leaflets. Moderate to severe aortic stenosis. The peak transaortic velocity is 3.58 m/s, peak transaortic gradient is 51.3 mmHg and mean transaortic gradient is 29.0 mmHg with an LVOT/aortic valve VTI ratio of 0.29. The effective orifice area is estimated at 1.01 cm² by the continuity equation and indexed at 0.65 cm²/m². Small pericardial effusion. Estimated pulmonary artery systolic pressure is 47 mmHg.    Home Medications: Amlodipine 5mg QD, Coreg 3.125 mg ?QD, Atorvastatin 80mg, Eliquis 5mg Q12h, Ezetemibe 10mg qd    #Atrial fibrillation  -tele reviewed showing mostly sinus tachycardia with PACs and intermittent bursts of afib RVR   -now that patient tolerating PO would d/c IV lopressor   -c/w PO toprol 25mg qd (hold for SBP <100, HR <60) ; can increase to 25mg BID if needed for better rate control   -CHADSVASC at least 7; c/w eliquis at reduced dose 2.5mg BID  -would permanently d/c home coreg and amlodipine     #moderate-severe AS  #MAC with moderate MS  -TTE today showing moderate-severe AS with MG 29  -caution with IVF; avoid large boluses   -per palliative note patient DNR/DNI with no invasive interventions   -continue to monitor ; will need follow up with outpatient cardiologist on discharge     Discussed with cardiology attending Dr. Naranjo  Recommendations not final until attested by attending

## 2025-06-03 LAB
ANION GAP SERPL CALC-SCNC: 10 MMOL/L — SIGNIFICANT CHANGE UP (ref 5–17)
BUN SERPL-MCNC: 5 MG/DL — LOW (ref 7–23)
CALCIUM SERPL-MCNC: 7.4 MG/DL — LOW (ref 8.4–10.5)
CHLORIDE SERPL-SCNC: 112 MMOL/L — HIGH (ref 96–108)
CO2 SERPL-SCNC: 19 MMOL/L — LOW (ref 22–31)
CREAT SERPL-MCNC: 0.49 MG/DL — LOW (ref 0.5–1.3)
CULTURE RESULTS: SIGNIFICANT CHANGE UP
CULTURE RESULTS: SIGNIFICANT CHANGE UP
EGFR: 95 ML/MIN/1.73M2 — SIGNIFICANT CHANGE UP
EGFR: 95 ML/MIN/1.73M2 — SIGNIFICANT CHANGE UP
GLUCOSE SERPL-MCNC: 74 MG/DL — SIGNIFICANT CHANGE UP (ref 70–99)
HCT VFR BLD CALC: 28.7 % — LOW (ref 34.5–45)
HGB BLD-MCNC: 9.1 G/DL — LOW (ref 11.5–15.5)
MAGNESIUM SERPL-MCNC: 1.8 MG/DL — SIGNIFICANT CHANGE UP (ref 1.6–2.6)
MCHC RBC-ENTMCNC: 28.5 PG — SIGNIFICANT CHANGE UP (ref 27–34)
MCHC RBC-ENTMCNC: 31.7 G/DL — LOW (ref 32–36)
MCV RBC AUTO: 90 FL — SIGNIFICANT CHANGE UP (ref 80–100)
NRBC BLD AUTO-RTO: 0 /100 WBCS — SIGNIFICANT CHANGE UP (ref 0–0)
PHOSPHATE SERPL-MCNC: 2.6 MG/DL — SIGNIFICANT CHANGE UP (ref 2.5–4.5)
PLATELET # BLD AUTO: 278 K/UL — SIGNIFICANT CHANGE UP (ref 150–400)
POTASSIUM SERPL-MCNC: 3.9 MMOL/L — SIGNIFICANT CHANGE UP (ref 3.5–5.3)
POTASSIUM SERPL-SCNC: 3.9 MMOL/L — SIGNIFICANT CHANGE UP (ref 3.5–5.3)
RBC # BLD: 3.19 M/UL — LOW (ref 3.8–5.2)
RBC # FLD: 16.8 % — HIGH (ref 10.3–14.5)
SODIUM SERPL-SCNC: 141 MMOL/L — SIGNIFICANT CHANGE UP (ref 135–145)
SPECIMEN SOURCE: SIGNIFICANT CHANGE UP
SPECIMEN SOURCE: SIGNIFICANT CHANGE UP
WBC # BLD: 11.42 K/UL — HIGH (ref 3.8–10.5)
WBC # FLD AUTO: 11.42 K/UL — HIGH (ref 3.8–10.5)

## 2025-06-03 PROCEDURE — 99233 SBSQ HOSP IP/OBS HIGH 50: CPT

## 2025-06-03 PROCEDURE — 93010 ELECTROCARDIOGRAM REPORT: CPT

## 2025-06-03 RX ORDER — MAGNESIUM SULFATE 500 MG/ML
1 SYRINGE (ML) INJECTION ONCE
Refills: 0 | Status: COMPLETED | OUTPATIENT
Start: 2025-06-03 | End: 2025-06-03

## 2025-06-03 RX ORDER — SODIUM CHLORIDE 9 G/1000ML
500 INJECTION, SOLUTION INTRAVENOUS ONCE
Refills: 0 | Status: COMPLETED | OUTPATIENT
Start: 2025-06-03 | End: 2025-06-03

## 2025-06-03 RX ORDER — DIGOXIN 250 UG/1
250 TABLET ORAL ONCE
Refills: 0 | Status: COMPLETED | OUTPATIENT
Start: 2025-06-03 | End: 2025-06-03

## 2025-06-03 RX ORDER — METOPROLOL SUCCINATE 50 MG/1
25 TABLET, EXTENDED RELEASE ORAL EVERY 8 HOURS
Refills: 0 | Status: DISCONTINUED | OUTPATIENT
Start: 2025-06-03 | End: 2025-06-05

## 2025-06-03 RX ORDER — SOD PHOS DI, MONO/K PHOS MONO 250 MG
1 TABLET ORAL ONCE
Refills: 0 | Status: COMPLETED | OUTPATIENT
Start: 2025-06-03 | End: 2025-06-03

## 2025-06-03 RX ORDER — DIGOXIN 250 UG/1
500 TABLET ORAL ONCE
Refills: 0 | Status: COMPLETED | OUTPATIENT
Start: 2025-06-03 | End: 2025-06-03

## 2025-06-03 RX ADMIN — APIXABAN 2.5 MILLIGRAM(S): 2.5 TABLET, FILM COATED ORAL at 06:24

## 2025-06-03 RX ADMIN — Medication 1 PACKET(S): at 08:08

## 2025-06-03 RX ADMIN — METOPROLOL SUCCINATE 25 MILLIGRAM(S): 50 TABLET, EXTENDED RELEASE ORAL at 07:35

## 2025-06-03 RX ADMIN — Medication 40 MILLIGRAM(S): at 13:03

## 2025-06-03 RX ADMIN — Medication 100 GRAM(S): at 09:20

## 2025-06-03 RX ADMIN — Medication 3 MILLIGRAM(S): at 21:42

## 2025-06-03 RX ADMIN — SODIUM CHLORIDE 500 MILLILITER(S): 9 INJECTION, SOLUTION INTRAVENOUS at 05:53

## 2025-06-03 RX ADMIN — Medication 2 TABLET(S): at 21:35

## 2025-06-03 RX ADMIN — APIXABAN 2.5 MILLIGRAM(S): 2.5 TABLET, FILM COATED ORAL at 17:32

## 2025-06-03 RX ADMIN — DIGOXIN 500 MICROGRAM(S): 250 TABLET ORAL at 09:51

## 2025-06-03 RX ADMIN — DIGOXIN 250 MICROGRAM(S): 250 TABLET ORAL at 15:19

## 2025-06-03 RX ADMIN — OXYCODONE HYDROCHLORIDE 5 MILLIGRAM(S): 30 TABLET ORAL at 21:35

## 2025-06-03 RX ADMIN — METOPROLOL SUCCINATE 25 MILLIGRAM(S): 50 TABLET, EXTENDED RELEASE ORAL at 21:35

## 2025-06-03 RX ADMIN — Medication 100 GRAM(S): at 08:07

## 2025-06-03 RX ADMIN — OXYCODONE HYDROCHLORIDE 5 MILLIGRAM(S): 30 TABLET ORAL at 22:05

## 2025-06-03 NOTE — DISCHARGE NOTE PROVIDER - CARE PROVIDER_API CALL
Glenn Herron  Surgery  [address] [place] [address], [address]  [place] [address]  Phone: [phone]  Fax: [phone]  Follow Up Time:    Glenn Herron  Surgery  [address] [place] [address], [address]  [place] [address]  Phone: [phone]  Fax: [phone]  Follow Up Time: 1 week

## 2025-06-03 NOTE — DISCHARGE NOTE PROVIDER - NSDCFUADDINST_GEN_ALL_CORE_FT
General Discharge Instructions:  Please resume all regular home medications unless specifically advised not to take a particular medication. Also, please take any new medications as prescribed.  Please get plenty of rest, continue to ambulate several times per day, and drink adequate amounts of fluids. Avoid lifting weights greater than 5-10 lbs until you follow-up with your surgeon, who will instruct you further regarding activity restrictions.  Avoid driving or operating heavy machinery while taking pain medications.  Please follow-up with your surgeon and Primary Care Provider (PCP) as advised.  Incision Care:  *Please call your doctor or nurse practitioner if you have increased pain, swelling, redness, or drainage from the incision site.  *Avoid swimming and baths until your follow-up appointment.  *You may shower, and wash surgical incisions with a mild soap and warm water. Gently pat the area dry.  *If you have staples, they will be removed at your follow-up appointment.  *If you have steri-strips, they will fall off on their own. Please remove any remaining strips 7-10 days after surgery.     Warning Signs:  Please call your doctor or nurse practitioner if you experience the following:  *You experience new chest pain, pressure, squeezing or tightness.  *New or worsening cough, shortness of breath, or wheeze.  *If you are vomiting and cannot keep down fluids or your medications.  *You are getting dehydrated due to continued vomiting, diarrhea, or other reasons. Signs of dehydration include dry mouth, rapid heartbeat, or feeling dizzy or faint when standing.  *You see blood or dark/black material when you vomit or have a bowel movement.  *You experience burning when you urinate, have blood in your urine, or experience a discharge.  *Your pain is not improving within 8-12 hours or is not gone within 24 hours. Call or return immediately if your pain is getting worse, changes location, or moves to your chest or back.  *You have shaking chills, or fever greater than 101.5 degrees Fahrenheit or 38 degrees Celsius.  *Any change in your symptoms, or any new symptoms that concern you.    General Discharge Instructions:  Please resume all regular home medications unless specifically advised not to take a particular medication. Also, please take any new medications as prescribed.  Please get plenty of rest, continue to ambulate several times per day, and drink adequate amounts of fluids. Avoid lifting weights greater than 5-10 lbs until you follow-up with your surgeon, who will instruct you further regarding activity restrictions.  Avoid driving or operating heavy machinery while taking pain medications.  Please follow-up with your surgeon and Primary Care Provider (PCP) as advised.  Incision Care:  *Please call your doctor or nurse practitioner if you have increased pain, swelling, redness, or drainage from the incision site.  *Avoid swimming and baths until your follow-up appointment.  *You may shower, and wash surgical incisions with a mild soap and warm water. Gently pat the area dry.  *If you have staples, they will be removed at your follow-up appointment.  *If you have steri-strips, they will fall off on their own. Please remove any remaining strips 7-10 days after surgery.     Warning Signs:  Please call your doctor or nurse practitioner if you experience the following:  *You experience new chest pain, pressure, squeezing or tightness.  *New or worsening cough, shortness of breath, or wheeze.  *If you are vomiting and cannot keep down fluids or your medications.  *You are getting dehydrated due to continued vomiting, diarrhea, or other reasons. Signs of dehydration include dry mouth, rapid heartbeat, or feeling dizzy or faint when standing.  *You see blood or dark/black material when you vomit or have a bowel movement.  *You experience burning when you urinate, have blood in your urine, or experience a discharge.  *Your pain is not improving within 8-12 hours or is not gone within 24 hours. Call or return immediately if your pain is getting worse, changes location, or moves to your chest or back.  *You have shaking chills, or fever greater than 101.5 degrees Fahrenheit or 38 degrees Celsius.  *Any change in your symptoms, or any new symptoms that concern you.     New medications:  - Toprol 25mg twice per day    General Discharge Instructions:  Please resume all regular home medications unless specifically advised not to take a particular medication. Also, please take any new medications as prescribed.  Please get plenty of rest, continue to ambulate several times per day, and drink adequate amounts of fluids. Avoid lifting weights greater than 5-10 lbs until you follow-up with your surgeon, who will instruct you further regarding activity restrictions.  Avoid driving or operating heavy machinery while taking pain medications.  Please follow-up with your surgeon and Primary Care Provider (PCP) as advised.  Incision Care:  *Please call your doctor or nurse practitioner if you have increased pain, swelling, redness, or drainage from the incision site.  *Avoid swimming and baths until your follow-up appointment.  *You may shower, and wash surgical incisions with a mild soap and warm water. Gently pat the area dry.  *If you have staples, they will be removed at your follow-up appointment.  *If you have steri-strips, they will fall off on their own. Please remove any remaining strips 7-10 days after surgery.     Warning Signs:  Please call your doctor or nurse practitioner if you experience the following:  *You experience new chest pain, pressure, squeezing or tightness.  *New or worsening cough, shortness of breath, or wheeze.  *If you are vomiting and cannot keep down fluids or your medications.  *You are getting dehydrated due to continued vomiting, diarrhea, or other reasons. Signs of dehydration include dry mouth, rapid heartbeat, or feeling dizzy or faint when standing.  *You see blood or dark/black material when you vomit or have a bowel movement.  *You experience burning when you urinate, have blood in your urine, or experience a discharge.  *Your pain is not improving within 8-12 hours or is not gone within 24 hours. Call or return immediately if your pain is getting worse, changes location, or moves to your chest or back.  *You have shaking chills, or fever greater than 101.5 degrees Fahrenheit or 38 degrees Celsius.  *Any change in your symptoms, or any new symptoms that concern you.       Please follow up with your Cardiologist upon discharge, as some of your medications were changed.    New medications:  - Toprol 25mg twice per day   - Eliquis 2.5mg twice per day\    Medications stopped  - Carvedilol  - Coreg

## 2025-06-03 NOTE — CHART NOTE - NSCHARTNOTEFT_GEN_A_CORE
Admitting Diagnosis:   Patient is a 80y old  Female who presents with a chief complaint of SBO (03 Jun 2025 11:30)  PAST MEDICAL & SURGICAL HISTORY:  No pertinent past medical history    Current Nutrition Order: pureed diet    PO Intake: Good (%) [   ]  Fair (50-75%) [   ] Poor (<25%) [   ] *variable PO intakes, some fair, some poor (</=50% overall*)    GI Issues: fecal incontinence noted, BM on 6/2/25; no noted distention by nursing; diarrhea 2x per nursing staff this morning (~6am then 8:30am)    Pain: no pain/discomfort noted     Skin Integrity: dryness, ecchymosis noted to skin; abdomen and left groin surgical incisions; stage II pressure ulcer to left malleous region; noted with pitting 3+ left ankle edema      06-02-25 @ 07:01  -  06-03-25 @ 07:00  --------------------------------------------------------  IN: 500 mL / OUT: 550 mL / NET: -50 mL    06-03-25 @ 07:01  -  06-03-25 @ 14:18  --------------------------------------------------------  IN: 100 mL / OUT: 0 mL / NET: 100 mL        Labs:   06-03    141  |  112[H]  |  5[L]  ----------------------------<  74  3.9   |  19[L]  |  0.49[L]    Ca    7.4[L]      03 Jun 2025 06:25  Phos  2.6     06-03  Mg     1.8     06-03    CAPILLARY BLOOD GLUCOSE    Medications:  MEDICATIONS  (STANDING):  apixaban 2.5 milliGRAM(s) Oral every 12 hours  digoxin  Injectable 250 MICROGram(s) IV Push once  melatonin 3 milliGRAM(s) Oral at bedtime  metoprolol succinate ER 25 milliGRAM(s) Oral daily  pantoprazole  Injectable 40 milliGRAM(s) IV Push every 12 hours  polyethylene glycol 3350 17 Gram(s) Oral daily  senna 2 Tablet(s) Oral at bedtime    MEDICATIONS  (PRN):  acetaminophen     Tablet .. 1000 milliGRAM(s) Oral every 6 hours PRN Mild Pain (1 - 3)  haloperidol    Injectable 0.5 milliGRAM(s) IntraMuscular once PRN Severe agitation secondary to psychosis, jacqueline, or poor impulse control  ondansetron Injectable 4 milliGRAM(s) IV Push every 6 hours PRN Nausea and/or Vomiting  oxyCODONE    IR 2.5 milliGRAM(s) Oral every 6 hours PRN Moderate Pain (4 - 6)  oxyCODONE    IR 5 milliGRAM(s) Oral every 6 hours PRN Severe Pain (7 - 10)    Dosing Anthropometrics:   height: 5 Feet, 6 Inch(s) (167.64 Centimeter(s))  weight: 113.5 lb (51.5 kg)  BMI: 18.3    Weight Change: no new weights noted in electronic medical records; recommend that nursing obtain updated weights weekly prn. RD to monitor trends.     [severe Protein Calorie Malnutrition: Risk Assessment Completed ]  - Wt Loss: ~13% wt loss [id number]  - nutrition focused physical exam: clavicle (moderate) muscle loss, orbital (moderate) fat loss    Estimated energy needs:  Weight used for calculations	current weight  Estimated Energy Needs Weight (lbs)	113.5 lb  Estimated Energy Needs Weight (kg)	51.5 kg  Estimated Energy Needs From (batsheva/kg)	25  Estimated Energy Needs To (batsheva/kg)	30  Estimated Energy Needs Calculated From (batsheva/kg)	1287  Estimated Energy Needs Calculated To (batsheva/kg)	1545  Weight used for calculations	current weight  Estimated Protein Needs Weight (lbs)	113.5 lb  Estimated Protein Needs Weight (kg)	51.5 kg  Estimated Protein Needs From (g/kg)	1.2  Estimated Protein Needs To (g/kg)	1.5  Estimated Protein Needs Calculated From (g/kg)	61.8  Estimated Protein Needs Calculated To (g/kg)	77.25  Estimated Fluid Needs Weight (lbs)	113.5 lb  Estimated Fluid Needs Weight (kg)	51.5 kg  Estimated Fluid Needs From (ml/kg)	30  Estimated Fluid Needs To (ml/kg)	35  Estimated Fluid Needs Calculated From (ml/kg)	1545  Estimated Fluid Needs Calculated To (ml/kg)	1802  Other Calculations	Pt is within ~% ideal body weight, thus actual body weight used for all calculations. Needs adjusted for advanced age, malnutrition, postoperative status.    Subjective:  This is an 80 year old Female pt with past medical history significant for dementia (AAOx1), CVA with LT hemiplegia, bedbound, PVD, HTN, HLD, Afib (on Eliquis), sent in from [facility] reporting LT groin pain x24 hours, pt denies any N/V/D, fever, chills, CP, SOB, reports normal BM yesterday, unsure if passing flatus, last meal yesterday. History limited secondary to patient dementia, some info per nursing home.     RD attempted to visit pt for follow up evaluation 2x, pt was being actively transferred to another unit and then engaged with medical team/nursing for care. Pt noted to be confused per nursing. Pt has been on a Pureed diet since 6/1/25. Pt noted with minimal PO intakes today per nursing (no breakfast this morning), noted with variable PO intakes, some fair, some poor (</=50% overall), presumed low appetite. Labs reviewed: low Creatinine (0.49), BUN (5), other nutrition-related labs are within normal limits at this time. Medical team is aware. RD to monitor trends. RD spoke to medical team during interdisciplinary rounds. RD to provide additional fortified foods for additional nutrients. RD to remain available. Please see additional nutrition recommendations below.     Previous Nutrition Diagnosis: Malnutrition of severe degree in the context of chronic illness/injury related to inability to meet nutritional demands via PO in the setting of presumed decreased appetite as evidenced by moderate muscle/fat loss per nutrition focused physical exam, ~13% wt loss [id number]    Active [ x ]  Resolved [   ]    If resolved, new PES:     Goal: Pt to consistently meet at least 75% of estimated energy expenditure via tolerated route that is consistent with goals of care during hospital stay    Nutrition Recommendations:  1. Continue pureed diet, textures/consistencies per speech pathology**  **provide fortified mashed potatoes (240 calories, 14g protein per serving), and strawberry vanilla smoothie (230 calories, 17g protein per serving) for additional nutrients**  **consider Banatrol 2x/day if loose stools continue**  2. Monitor PO intakes, trend weights (weekly), monitor skin integrity, monitor labs (electrolytes, CMP), monitor GI function   3. Recommend daily multivitamin supplementation for general nutrient coverage   4. Pain and bowel regimen per team   5. Will continue to assess/honor preferences as able, if applicable  6. Align nutrition interventions with goals of care at all times     Education deferred at this time related to pt's cognitive status. RD to remain available prn.

## 2025-06-03 NOTE — PROVIDER CONTACT NOTE (CHANGE IN STATUS NOTIFICATION) - ASSESSMENT
Patient is alert and oriented x1. Patient assessed at change of shift and presents tachycardic and hypotensive. Patient at baseline has left sided hemiplegia. Patient has no apparent distress noted, denies chest pain and shortness of breath.

## 2025-06-03 NOTE — PROVIDER CONTACT NOTE (CHANGE IN STATUS NOTIFICATION) - BACKGROUND
Patient has history of dementia, CVA with left sided hemiplegia, PVD, hypertension, a fib, hyperlipidemia.

## 2025-06-03 NOTE — PROVIDER CONTACT NOTE (CHANGE IN STATUS NOTIFICATION) - SITUATION
Patient is 80 year old female post op day 5 from open left femoral hernia repair with mesh. patient presents with tachycardia. EKG done before change of shift, bolus of LR given.

## 2025-06-03 NOTE — PROGRESS NOTE ADULT - SUBJECTIVE AND OBJECTIVE BOX
SUBJECTIVE / INTERVAL HPI: Early AM patient went into afib RVR with 's. S/p 250cc IVF by primary team. Patient seen and examined at bedside. Reports she is feeling fine and denies any chest pain, SOB, palpitations, lightheadedness or dizziness. Denies any pain at rest.    PHYSICAL EXAM:    General: elderly female lying in bed, contracted towards left side , NAD  HEENT: NC/AT   Neck: supple, no JVD  Cardiovascular: +S1/S2, irregular rhythm, tachycardic rate, +systolic murmur  Respiratory: CTA B/L   Extremities: WWP; no LE edema      VITAL SIGNS:  Vital Signs Last 24 Hrs  T(C): 37.7 (03 Jun 2025 08:20), Max: 37.7 (03 Jun 2025 08:20)  T(F): 99.8 (03 Jun 2025 08:20), Max: 99.8 (03 Jun 2025 08:20)  HR: 132 (03 Jun 2025 09:40) (94 - 145)  BP: 103/75 (03 Jun 2025 09:40) (90/64 - 127/60)  BP(mean): 86 (03 Jun 2025 09:40) (76 - 86)  RR: 18 (03 Jun 2025 09:40) (17 - 19)  SpO2: 98% (03 Jun 2025 09:40) (96% - 100%)    Parameters below as of 03 Jun 2025 09:40  Patient On (Oxygen Delivery Method): room air          MEDICATIONS:  MEDICATIONS  (STANDING):  apixaban 2.5 milliGRAM(s) Oral every 12 hours  digoxin  Injectable 250 MICROGram(s) IV Push once  melatonin 3 milliGRAM(s) Oral at bedtime  metoprolol succinate ER 25 milliGRAM(s) Oral daily  pantoprazole  Injectable 40 milliGRAM(s) IV Push every 12 hours  polyethylene glycol 3350 17 Gram(s) Oral daily  senna 2 Tablet(s) Oral at bedtime    MEDICATIONS  (PRN):  acetaminophen     Tablet .. 1000 milliGRAM(s) Oral every 6 hours PRN Mild Pain (1 - 3)  haloperidol    Injectable 0.5 milliGRAM(s) IntraMuscular once PRN Severe agitation secondary to psychosis, jacqueline, or poor impulse control  ondansetron Injectable 4 milliGRAM(s) IV Push every 6 hours PRN Nausea and/or Vomiting  oxyCODONE    IR 2.5 milliGRAM(s) Oral every 6 hours PRN Moderate Pain (4 - 6)  oxyCODONE    IR 5 milliGRAM(s) Oral every 6 hours PRN Severe Pain (7 - 10)      ALLERGIES:  Allergies    No Known Allergies    Intolerances        LABS:                        9.1    11.42 )-----------( 278      ( 03 Jun 2025 06:25 )             28.7     06-03    141  |  112[H]  |  5[L]  ----------------------------<  74  3.9   |  19[L]  |  0.49[L]    Ca    7.4[L]      03 Jun 2025 06:25  Phos  2.6     06-03  Mg     1.8     06-03        Urinalysis Basic - ( 03 Jun 2025 06:25 )    Color: x / Appearance: x / SG: x / pH: x  Gluc: 74 mg/dL / Ketone: x  / Bili: x / Urobili: x   Blood: x / Protein: x / Nitrite: x   Leuk Esterase: x / RBC: x / WBC x   Sq Epi: x / Non Sq Epi: x / Bacteria: x      CAPILLARY BLOOD GLUCOSE      POCT Blood Glucose.: 83 mg/dL (02 Jun 2025 11:45)      RADIOLOGY & ADDITIONAL TESTS: Reviewed.

## 2025-06-03 NOTE — PROGRESS NOTE ADULT - ASSESSMENT
80F PMH dementia (AAOx1), CVA w/ LT hemiplegia & bedbound, PVD, HTN, HLD, atrial fibrillation (on Eliquis), initially sent in from [facility] with left groin pain. Found to have SBO with incarcerated loops of ileum within a left femoral hernia and likely colonic mass. Admitted to surgical service for further management now s/p L femoral hernia repair with mesh placement 5/29. Post op noted to have afib RVR on telemetry for which cardiology was consulted.    Review of studies:  TTE 6/2/25: Technically difficult image quality. Left ventricular cavity is small. Unable to evaluate left ventricular ejection fraction. Appears hyperdynamic but apical segments are not visualize. Normal right ventricular cavity size and normal right ventricular systolic function. Left atrium is dilated. There is severe calcification of the mitral valve annulus. Moderate mitral valve stenosis, secondary to dystrophic mitral annular calcification. Trileaflet aortic valve with reduced systolic excursion. There is severe calcification of the aortic valve leaflets. Moderate to severe aortic stenosis. The peak transaortic velocity is 3.58 m/s, peak transaortic gradient is 51.3 mmHg and mean transaortic gradient is 29.0 mmHg with an LVOT/aortic valve VTI ratio of 0.29. The effective orifice area is estimated at 1.01 cm² by the continuity equation and indexed at 0.65 cm²/m². Small pericardial effusion. Estimated pulmonary artery systolic pressure is 47 mmHg.    Home Medications: Amlodipine 5mg QD, Coreg 3.125 mg ?QD, Atorvastatin 80mg, Eliquis 5mg Q12h, Ezetemibe 10mg qd    #Atrial fibrillation  -patient previously rate controlled in mostly NSR however early this morning went into afib RVR. ECG this morning afib, rate 148. Unable to review tele this morning as pt no longer on telemetry floor however HR sustaining 140's on monitor with SBP 's. Patient remaining asymptomatic. Rectal temp normal, electrolytes normal, bladder scan without urinary retention.  -would upgrade patient to telemetry unit for continuous tele monitoring   -c/w PO toprol 25mg qd (hold for SBP <100, HR <60)   -dig load: give digoxin 500mcg IVP x1 and then 6 hours after 500mcg dose would give another 250mcg IVP x1   -CHADSVASC at least 7; c/w eliquis at reduced dose 2.5mg BID  -would permanently d/c home coreg and amlodipine     #moderate-severe AS  #MAC with moderate MS  -TTE this admission showing moderate-severe AS with MG 29  -caution with IVF; avoid large boluses   -per palliative note patient DNR/DNI with no invasive interventions   -continue to monitor ; will need follow up with outpatient cardiologist on discharge     Discussed with cardiology attending Dr. Naranjo  Recommendations not final until attested by attending

## 2025-06-03 NOTE — PROGRESS NOTE ADULT - ASSESSMENT
80F w/ dementia, CVA w/ LT hemiplegia,admitted for findings of SBO 2/2 incarcerated loop of ileum within a LT femoral hernia and incidental finding of anemia with possible met ascending colon cancer on CT. Now s/p open left femoral hernia repair with mesh, dx laparoscopy w/ healthy bowel (5/29).     Reg pureed  Pain/Nausea PRN  PPI BID   GI recs   Iron (6/2-6/5)  Eliquis  Bedbound/IS/SCDs

## 2025-06-03 NOTE — PROGRESS NOTE ADULT - SUBJECTIVE AND OBJECTIVE BOX
STATUS POST:  Left femoral hernia repair, Diagnostic laparoscopy    POST OPERATIVE DAY #: 5    SUBJECTIVE: Pt seen and examined by chief resident on AM rounds. Pt doing well, resting comfortably on bed. Pain controlled. . No complaints at this time.     Vital Signs Last 24 Hrs  T(C): 37.2 (03 Jun 2025 05:27), Max: 37.4 (02 Jun 2025 23:56)  T(F): 98.9 (03 Jun 2025 05:27), Max: 99.4 (02 Jun 2025 23:56)  HR: 138 (03 Jun 2025 05:27) (94 - 138)  BP: 97/63 (03 Jun 2025 05:27) (97/63 - 127/60)  BP(mean): 79 (02 Jun 2025 17:00) (76 - 85)  RR: 17 (03 Jun 2025 05:27) (17 - 18)  SpO2: 99% (03 Jun 2025 05:27) (96% - 100%)    Parameters below as of 03 Jun 2025 05:27  Patient On (Oxygen Delivery Method): room air        I&O's Summary    02 Jun 2025 07:01  -  03 Jun 2025 07:00  --------------------------------------------------------  IN: 500 mL / OUT: 550 mL / NET: -50 mL        Physical Exam:  General Appearance: Appears well, NAD  Pulmonary: Nonlabored breathing, no respiratory distress  Abdomen: Soft, nondistended, nontender, incisions clean dry and intact   Extremities: WWP, SCD's in place     LABS:                        9.1    11.42 )-----------( 278      ( 03 Jun 2025 06:25 )             28.7     06-02    135  |  108  |  4[L]  ----------------------------<  74  4.0   |  16[L]  |  0.43[L]    Ca    7.5[L]      02 Jun 2025 07:40  Phos  2.9     06-02  Mg     1.9     06-02        Urinalysis Basic - ( 02 Jun 2025 07:40 )    Color: x / Appearance: x / SG: x / pH: x  Gluc: 74 mg/dL / Ketone: x  / Bili: x / Urobili: x   Blood: x / Protein: x / Nitrite: x   Leuk Esterase: x / RBC: x / WBC x   Sq Epi: x / Non Sq Epi: x / Bacteria: x

## 2025-06-03 NOTE — DISCHARGE NOTE PROVIDER - NSDCCPTREATMENT_GEN_ALL_CORE_FT
PRINCIPAL PROCEDURE  Procedure: Left femoral hernia repair  Findings and Treatment:       SECONDARY PROCEDURE  Procedure: Diagnostic laparoscopy  Findings and Treatment:

## 2025-06-03 NOTE — DISCHARGE NOTE PROVIDER - DETAILS OF MALNUTRITION DIAGNOSIS/DIAGNOSES
This patient has been assessed with a concern for Malnutrition and was treated during this hospitalization for the following Nutrition diagnosis/diagnoses:     -  05/30/2025: Severe protein-calorie malnutrition   -  05/30/2025: Underweight (BMI < 19)

## 2025-06-03 NOTE — DISCHARGE NOTE PROVIDER - NSDCMRMEDTOKEN_GEN_ALL_CORE_FT
amLODIPine 5 mg oral tablet: 1 tab(s) orally once a day  atorvastatin 80 mg oral tablet: 1 tab(s) orally once a day  carvedilol 3.125 mg oral tablet: 1 tab(s) orally once a day  Eliquis 5 mg oral tablet: 1 tab(s) orally 2 times a day  ezetimibe 10 mg oral tablet: 1 tab(s) orally once a day  melatonin 3 mg oral tablet, disintegratin tab(s) orally once a day  mirtazapine 15 mg oral tablet: 1 tab(s) orally once a day  sertraline 50 mg oral tablet: 1 tab(s) orally once a day   atorvastatin 80 mg oral tablet: 1 tab(s) orally once a day  Eliquis 2.5 mg oral tablet: orally every 12 hours  ezetimibe 10 mg oral tablet: 1 tab(s) orally once a day  melatonin 3 mg oral tablet, disintegratin tab(s) orally once a day  metoprolol succinate 25 mg oral tablet, extended release: 1 tab(s) orally every 12 hours  mirtazapine 15 mg oral tablet: 1 tab(s) orally once a day  sertraline 50 mg oral tablet: 1 tab(s) orally once a day

## 2025-06-03 NOTE — DISCHARGE NOTE PROVIDER - HOSPITAL COURSE
80 female patient w/ dementia, CVA w/ LT hemiplegia, was admitted for findings of SBO 2/2 incarcerated loop of ileum within a LT femoral hernia and incidental finding of anemia with possible met ascending colon cancer on CT. Now s/p open left femoral hernia repair with mesh, dx laparoscopy w/ healthy bowel (5/29). Post-op course complicated by AFib with RVR to 140s, for which cardiology was engaged. 80 female patient w/ dementia, CVA w/ LT hemiplegia, was admitted for findings of SBO 2/2 incarcerated loop of ileum within a LT femoral hernia and incidental finding of anemia with possible met ascending colon cancer on CT. Now s/p open left femoral hernia repair with mesh, dx laparoscopy w/ healthy bowel (5/29). Post-op course complicated by AFib with RVR to 140s, for which cardiology was engaged. Her postoperative course was unremarkable with advancement of diet, passing trial of void, and pain control. On day of discharge patient was stable to be d/c'd to nursing facility.     80 female patient w/ dementia, CVA w/ LT hemiplegia, was admitted for findings of SBO 2/2 incarcerated loop of ileum within a LT femoral hernia and incidental finding of anemia with possible colon mass on CT. Patient underwent open left femoral hernia repair with mesh, dx laparoscopy with findings of healthy bowel on 5/29. Post-op course complicated by AFib with RVR to 140s, for which cardiology gave recommendations. Patient returned to normal sinus and rest of hospital stay unremarkable. Patient with advancement of diet, passing trial of void, and pain control. On day of discharge patient was stable to be d/c'd to nursing facility.     80 female patient w/ dementia, CVA w/ LT hemiplegia, was admitted for findings of SBO 2/2 incarcerated loop of ileum within a LT femoral hernia and incidental finding of anemia with possible colon mass on CT. Patient underwent open left femoral hernia repair with mesh, dx laparoscopy with findings of healthy bowel on 5/29. Post-op course complicated by AFib with RVR to 140s, for which cardiology gave recommendations, she was loaded with Dogoxin, now with rates averaging the 90's. Rest of hospital stay unremarkable. Patient with advancement of diet, passing trial of void, and pain control. On day of discharge patient was stable to be d/c'd to nursing facility.

## 2025-06-04 LAB — DIGOXIN SERPL-MCNC: 2.1 NG/ML — HIGH (ref 0.8–2)

## 2025-06-04 PROCEDURE — 99232 SBSQ HOSP IP/OBS MODERATE 35: CPT

## 2025-06-04 PROCEDURE — 99232 SBSQ HOSP IP/OBS MODERATE 35: CPT | Mod: GC

## 2025-06-04 PROCEDURE — 93010 ELECTROCARDIOGRAM REPORT: CPT

## 2025-06-04 RX ADMIN — Medication 3 MILLIGRAM(S): at 22:13

## 2025-06-04 RX ADMIN — APIXABAN 2.5 MILLIGRAM(S): 2.5 TABLET, FILM COATED ORAL at 17:04

## 2025-06-04 RX ADMIN — Medication 2 TABLET(S): at 22:13

## 2025-06-04 RX ADMIN — Medication 40 MILLIGRAM(S): at 11:21

## 2025-06-04 RX ADMIN — Medication 40 MILLIGRAM(S): at 22:13

## 2025-06-04 RX ADMIN — METOPROLOL SUCCINATE 25 MILLIGRAM(S): 50 TABLET, EXTENDED RELEASE ORAL at 05:16

## 2025-06-04 RX ADMIN — METOPROLOL SUCCINATE 25 MILLIGRAM(S): 50 TABLET, EXTENDED RELEASE ORAL at 14:35

## 2025-06-04 RX ADMIN — METOPROLOL SUCCINATE 25 MILLIGRAM(S): 50 TABLET, EXTENDED RELEASE ORAL at 22:14

## 2025-06-04 RX ADMIN — Medication 40 MILLIGRAM(S): at 01:16

## 2025-06-04 RX ADMIN — APIXABAN 2.5 MILLIGRAM(S): 2.5 TABLET, FILM COATED ORAL at 05:16

## 2025-06-04 NOTE — PROGRESS NOTE ADULT - TIME BILLING
As above
Bedside exam and interview   Reviewed vitals, labs  Discussed patient's plan of care with primary team   Documentation of encounter  Time spent on the encounter excludes teaching and separately reported services
As above
As above

## 2025-06-04 NOTE — DISCHARGE NOTE NURSING/CASE MANAGEMENT/SOCIAL WORK - FINANCIAL ASSISTANCE
[facility] provides services at a reduced cost to those who are determined to be eligible through [facility]’s financial assistance program. Information regarding [facility]’s financial assistance program can be found by going to [email or web address] or by calling [phone].

## 2025-06-04 NOTE — PROGRESS NOTE ADULT - SUBJECTIVE AND OBJECTIVE BOX
SUBJECTIVE / INTERVAL HPI: Patient seen and examined at bedside. Resting comfortably. Denies any pain. Review of tele showing afib , rare PVCs with -120's overnight and now rate controlled in 80's this morning.     PHYSICAL EXAM:    General: elderly female lying in bed, contracted towards left side , NAD  HEENT: NC/AT   Neck: supple, JVP flat   Cardiovascular: +S1/S2, irregular rhythm, tachycardic rate, +systolic murmur  Respiratory: CTA B/L   Extremities: WWP; no LE edema      VITAL SIGNS:  Vital Signs Last 24 Hrs  T(C): 36.8 (04 Jun 2025 14:08), Max: 36.8 (04 Jun 2025 08:47)  T(F): 98.3 (04 Jun 2025 14:08), Max: 98.3 (04 Jun 2025 14:08)  HR: 94 (04 Jun 2025 12:30) (94 - 126)  BP: 113/56 (04 Jun 2025 12:30) (106/59 - 141/71)  BP(mean): 79 (04 Jun 2025 12:30) (71 - 111)  RR: 18 (04 Jun 2025 12:30) (18 - 18)  SpO2: 99% (04 Jun 2025 12:30) (97% - 100%)    Parameters below as of 04 Jun 2025 12:30  Patient On (Oxygen Delivery Method): room air          MEDICATIONS:  MEDICATIONS  (STANDING):  apixaban 2.5 milliGRAM(s) Oral every 12 hours  melatonin 3 milliGRAM(s) Oral at bedtime  metoprolol tartrate 25 milliGRAM(s) Oral every 8 hours  pantoprazole  Injectable 40 milliGRAM(s) IV Push every 12 hours  polyethylene glycol 3350 17 Gram(s) Oral daily  senna 2 Tablet(s) Oral at bedtime    MEDICATIONS  (PRN):  acetaminophen     Tablet .. 1000 milliGRAM(s) Oral every 6 hours PRN Mild Pain (1 - 3)  haloperidol    Injectable 0.5 milliGRAM(s) IntraMuscular once PRN Severe agitation secondary to psychosis, jacqueline, or poor impulse control  ondansetron Injectable 4 milliGRAM(s) IV Push every 6 hours PRN Nausea and/or Vomiting  oxyCODONE    IR 2.5 milliGRAM(s) Oral every 6 hours PRN Moderate Pain (4 - 6)  oxyCODONE    IR 5 milliGRAM(s) Oral every 6 hours PRN Severe Pain (7 - 10)      ALLERGIES:  Allergies    No Known Allergies    Intolerances        LABS:                        9.1    11.42 )-----------( 278      ( 03 Jun 2025 06:25 )             28.7     06-03    141  |  112[H]  |  5[L]  ----------------------------<  74  3.9   |  19[L]  |  0.49[L]    Ca    7.4[L]      03 Jun 2025 06:25  Phos  2.6     06-03  Mg     1.8     06-03        Urinalysis Basic - ( 03 Jun 2025 06:25 )    Color: x / Appearance: x / SG: x / pH: x  Gluc: 74 mg/dL / Ketone: x  / Bili: x / Urobili: x   Blood: x / Protein: x / Nitrite: x   Leuk Esterase: x / RBC: x / WBC x   Sq Epi: x / Non Sq Epi: x / Bacteria: x      CAPILLARY BLOOD GLUCOSE          RADIOLOGY & ADDITIONAL TESTS: Reviewed.

## 2025-06-04 NOTE — PROGRESS NOTE ADULT - SUBJECTIVE AND OBJECTIVE BOX
Patient is a 80y old  Female who presents with a chief complaint of SBO (03 Jun 2025 11:30)    INTERVAL EVENTS:    SUBJECTIVE:  Patient was seen and examined at bedside.    Review of systems: No fever, chills, dizziness, HA, Changes in vision, CP, dyspnea, nausea or vomiting, dysuria, changes in bowel movements, LE edema. Rest of 12 point Review of systems negative unless otherwise documented elsewhere in note.     Diet, Regular:   Pureed (PUREED) (06-01-25 @ 10:33) [Active]      MEDICATIONS:  MEDICATIONS  (STANDING):  apixaban 2.5 milliGRAM(s) Oral every 12 hours  melatonin 3 milliGRAM(s) Oral at bedtime  metoprolol tartrate 25 milliGRAM(s) Oral every 8 hours  pantoprazole  Injectable 40 milliGRAM(s) IV Push every 12 hours  polyethylene glycol 3350 17 Gram(s) Oral daily  senna 2 Tablet(s) Oral at bedtime    MEDICATIONS  (PRN):  acetaminophen     Tablet .. 1000 milliGRAM(s) Oral every 6 hours PRN Mild Pain (1 - 3)  haloperidol    Injectable 0.5 milliGRAM(s) IntraMuscular once PRN Severe agitation secondary to psychosis, jacqueline, or poor impulse control  ondansetron Injectable 4 milliGRAM(s) IV Push every 6 hours PRN Nausea and/or Vomiting  oxyCODONE    IR 2.5 milliGRAM(s) Oral every 6 hours PRN Moderate Pain (4 - 6)  oxyCODONE    IR 5 milliGRAM(s) Oral every 6 hours PRN Severe Pain (7 - 10)      Allergies    No Known Allergies    Intolerances        OBJECTIVE:  Vital Signs Last 24 Hrs  T(C): 36.8 (04 Jun 2025 08:47), Max: 37.3 (03 Jun 2025 13:49)  T(F): 98.2 (04 Jun 2025 08:47), Max: 99.1 (03 Jun 2025 13:49)  HR: 105 (04 Jun 2025 08:00) (105 - 126)  BP: 113/83 (04 Jun 2025 08:00) (99/58 - 141/71)  BP(mean): 94 (04 Jun 2025 08:00) (70 - 111)  RR: 18 (04 Jun 2025 08:00) (18 - 18)  SpO2: 100% (04 Jun 2025 08:00) (97% - 100%)    Parameters below as of 04 Jun 2025 08:00  Patient On (Oxygen Delivery Method): room air      I&O's Summary    03 Jun 2025 07:01  -  04 Jun 2025 07:00  --------------------------------------------------------  IN: 100 mL / OUT: 800 mL / NET: -700 mL    04 Jun 2025 07:01  -  04 Jun 2025 11:05  --------------------------------------------------------  IN: 50 mL / OUT: 200 mL / NET: -150 mL        PHYSICAL EXAM:  Gen: Reclining in bed at time of exam, appears stated age  HEENT: NCAT, MMM, clear OP  Neck: supple, trachea at midline  CV: RRR, +S1/S2  Pulm: adequate respiratory effort, no increase in work of breathing  Abd: soft, NTND  Skin: warm and dry,   Ext: WWP, no LE edema  Neuro: AOx3, speaking in full sentences  Psych: affect and behavior appropriate, pleasant at time of interview    LABS:                        9.1    11.42 )-----------( 278      ( 03 Jun 2025 06:25 )             28.7     06-03    141  |  112[H]  |  5[L]  ----------------------------<  74  3.9   |  19[L]  |  0.49[L]    Ca    7.4[L]      03 Jun 2025 06:25  Phos  2.6     06-03  Mg     1.8     06-03          CAPILLARY BLOOD GLUCOSE        Urinalysis Basic - ( 03 Jun 2025 06:25 )    Color: x / Appearance: x / SG: x / pH: x  Gluc: 74 mg/dL / Ketone: x  / Bili: x / Urobili: x   Blood: x / Protein: x / Nitrite: x   Leuk Esterase: x / RBC: x / WBC x   Sq Epi: x / Non Sq Epi: x / Bacteria: x        MICRODATA:      RADIOLOGY/OTHER STUDIES:

## 2025-06-04 NOTE — PROGRESS NOTE ADULT - SUBJECTIVE AND OBJECTIVE BOX
INTERVAL HPI/OVERNIGHT EVENTS: STEPHANE    SUBJECTIVE: Patient seen and examined at bedside. Patient reports doing well. Incisional pain well controlled with medication. Tolerating diet without n/v. +flatus and +BM.     MEDICATIONS  (STANDING):  apixaban 2.5 milliGRAM(s) Oral every 12 hours  melatonin 3 milliGRAM(s) Oral at bedtime  metoprolol tartrate 25 milliGRAM(s) Oral every 8 hours  pantoprazole  Injectable 40 milliGRAM(s) IV Push every 12 hours  polyethylene glycol 3350 17 Gram(s) Oral daily  senna 2 Tablet(s) Oral at bedtime    MEDICATIONS  (PRN):  acetaminophen     Tablet .. 1000 milliGRAM(s) Oral every 6 hours PRN Mild Pain (1 - 3)  haloperidol    Injectable 0.5 milliGRAM(s) IntraMuscular once PRN Severe agitation secondary to psychosis, jacqueline, or poor impulse control  ondansetron Injectable 4 milliGRAM(s) IV Push every 6 hours PRN Nausea and/or Vomiting  oxyCODONE    IR 2.5 milliGRAM(s) Oral every 6 hours PRN Moderate Pain (4 - 6)  oxyCODONE    IR 5 milliGRAM(s) Oral every 6 hours PRN Severe Pain (7 - 10)      Vital Signs Last 24 Hrs  T(C): 36.6 (04 Jun 2025 04:55), Max: 37.7 (03 Jun 2025 08:20)  T(F): 97.8 (04 Jun 2025 04:55), Max: 99.8 (03 Jun 2025 08:20)  HR: 112 (04 Jun 2025 04:05) (112 - 145)  BP: 106/61 (04 Jun 2025 04:05) (90/64 - 141/71)  BP(mean): 71 (04 Jun 2025 04:05) (70 - 111)  RR: 18 (04 Jun 2025 04:05) (17 - 19)  SpO2: 97% (04 Jun 2025 04:05) (97% - 100%)    Parameters below as of 04 Jun 2025 04:05  Patient On (Oxygen Delivery Method): room air        PHYSICAL EXAM:    Constitutional: A&Ox3    Respiratory: non labored breathing, no respiratory distress    Cardiovascular: NSR, RRR    Gastrointestinal: soft, ND, NT                 Incision: c/d/i    Extremities: (-) edema                  I&O's Detail    02 Jun 2025 07:01  -  03 Jun 2025 07:00  --------------------------------------------------------  IN:    IV PiggyBack: 100 mL    Oral Fluid: 400 mL  Total IN: 500 mL    OUT:    Voided (mL): 550 mL  Total OUT: 550 mL    Total NET: -50 mL      03 Jun 2025 07:01  -  04 Jun 2025 06:59  --------------------------------------------------------  IN:    IV PiggyBack: 100 mL  Total IN: 100 mL    OUT:    Voided (mL): 500 mL  Total OUT: 500 mL    Total NET: -400 mL          LABS:                        9.1    11.42 )-----------( 278      ( 03 Jun 2025 06:25 )             28.7     06-03    141  |  112[H]  |  5[L]  ----------------------------<  74  3.9   |  19[L]  |  0.49[L]    Ca    7.4[L]      03 Jun 2025 06:25  Phos  2.6     06-03  Mg     1.8     06-03        Urinalysis Basic - ( 03 Jun 2025 06:25 )    Color: x / Appearance: x / SG: x / pH: x  Gluc: 74 mg/dL / Ketone: x  / Bili: x / Urobili: x   Blood: x / Protein: x / Nitrite: x   Leuk Esterase: x / RBC: x / WBC x   Sq Epi: x / Non Sq Epi: x / Bacteria: x        RADIOLOGY & ADDITIONAL STUDIES:

## 2025-06-04 NOTE — PROGRESS NOTE ADULT - ASSESSMENT
80F w/ dementia, CVA w/ LT hemiplegia,admitted for findings of SBO 2/2 incarcerated loop of ileum within a LT femoral hernia and incidental finding of anemia with possible met ascending colon cancer on CT. Now s/p open left femoral hernia repair with mesh, dx laparoscopy w/ healthy bowel (5/29).     Reg pureed  Pain/Nausea PRN  PPI BID   GI recs   Iron (6/2-6/5)  Eliquis  Bedbound/IS/SCDs  No AM Labs

## 2025-06-04 NOTE — PROGRESS NOTE ADULT - ATTENDING COMMENTS
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 Aid at bedside reports she has not been eating Much.   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 Favor low twice a day dosing of Toprol rather than 50 mg po QD given frequent borderline BP. This would precluding withholding meds at [facility] when she is discharged  - CHADSVASC is at least 7 placing patient at high thromboembolic Risk; Cont with Eliquis at reduced dose 2.5mg BID ( 51.5 Kgs; Age 81 yo)  - Given Multivavlvular Heart Disease, would be judicious with standing fluids and encourage PO intake instead  - Patient currently DNR/DNI with ongoing GOC discussion this hospitalization.  - Please call cardiology with any questions .
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 Would not maintain on Digoxin after that  - Would DC Toprol and start patient on Lopressor 25 mg po Q/8 with strict holding parameters ( hold for SBP <100/60 and HR<60) with goal to uptitrate as tolerated  - If despite aforementioned regimen, HR remains persistently >110 with HD compromise, would than use Amiodarone  - Please avoid using Amiodarone concurrently with Digoxin   - Recommend upgrade to Telemetry for continued monitoring   - CHADSVASC is at least 7 placing patient at high thromboembolic Risk; Cont with Eliquis at reduced dose 2.5mg BID ( 51.5 Kgs; Age 81 yo)  - Given Multivavlvular Heart Disease, would be judicious with standing fluids and encourage PO intake instead  - Patient currently DNR/DNI with ongoing GOC discussion this hospitalization  - Please call cardiology with any questions .
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 She denies chest pain, palpitations or anginal symptoms. 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 JVP is around 8 cm  - From an Afib standpoint,  Cont with Toprol 25mg qd (hold for SBP <100, HR <60) ; can increase to [place] 25mg BID if needed for better rate control   - CHADSVASC is at least 7 placing patient at high thromboembolic Risk; Cont with Eliquis at reduced dose 2.5mg BID ( 51.5 Kgs; Age 79 yo)  - Given Multivavlvular Heart Disease, would be judicious with standing fluids and encourage PO intake instead  - Of note, should SBP be persistently >140/90, and better BP control needed, would switch Toprol to Coreg 6.25 mg po BID with strict Holding parameters  - Patient currently DNR/DNI with ongoing GOC discussion and no plans for further invasive interventions  - Please call cardiology with any questions

## 2025-06-04 NOTE — PROGRESS NOTE ADULT - ASSESSMENT
80F PMH dementia (AAOx1), CVA w/ LT hemiplegia & bedbound, PVD, HTN, HLD, atrial fibrillation (on Eliquis), initially sent in from [facility] with left groin pain. Found to have SBO with incarcerated loops of ileum within a left femoral hernia and likely colonic mass. Admitted to surgical service for further management now s/p L femoral hernia repair with mesh placement 5/29. Post op noted to have afib RVR on telemetry for which cardiology was consulted.    Review of studies:  TTE 6/2/25: Technically difficult image quality. Left ventricular cavity is small. Unable to evaluate left ventricular ejection fraction. Appears hyperdynamic but apical segments are not visualize. Normal right ventricular cavity size and normal right ventricular systolic function. Left atrium is dilated. There is severe calcification of the mitral valve annulus. Moderate mitral valve stenosis, secondary to dystrophic mitral annular calcification. Trileaflet aortic valve with reduced systolic excursion. There is severe calcification of the aortic valve leaflets. Moderate to severe aortic stenosis. The peak transaortic velocity is 3.58 m/s, peak transaortic gradient is 51.3 mmHg and mean transaortic gradient is 29.0 mmHg with an LVOT/aortic valve VTI ratio of 0.29. The effective orifice area is estimated at 1.01 cm² by the continuity equation and indexed at 0.65 cm²/m². Small pericardial effusion. Estimated pulmonary artery systolic pressure is 47 mmHg.    Home Medications: Amlodipine 5mg QD, Coreg 3.125 mg ?QD, Atorvastatin 80mg, Eliquis 5mg Q12h, Ezetemibe 10mg qd    #Atrial fibrillation  -history of chronic afib ; yesterday went into afib RVR ; given digoxin load with improving rates this morning   -c/w PO lopressor 25mg q8 while inpatient (hold for SBP <100, HR <60) ; would ultimately discharge on metoprolol succinate 25mg BID  -CHADSVASC at least 7; c/w eliquis at reduced dose 2.5mg BID  -would permanently d/c home coreg and amlodipine     #moderate-severe AS  #MAC with moderate MS  -TTE this admission showing moderate-severe AS with MG 29  -caution with IVF; avoid large boluses   -per palliative note patient DNR/DNI with no invasive interventions   -continue to monitor ; patient can follow up with cardiologist Dr. Hammer within 1 month of discharge. If patient's GOC change, can follow up with structural heart as outpatient.    Discussed with cardiology attending Dr. Naranjo  Recommendations not final until attested by attending

## 2025-06-04 NOTE — PROGRESS NOTE ADULT - SUBJECTIVE AND OBJECTIVE BOX
SUBJECTIVE:  Patient was seen and examined at bedside. Patient remains with episodes of RVR, looking comfortable, denies chest pain, no SOB, afebrile, denies pain, when as asked about previous knee pain, reports some discomfort. No other complaints or events reported. Aid at the bedside. Telemetry reviewed.    Review of systems: limited, negative     Diet, Regular:   Pureed (PUREED) (06-01-25 @ 10:33) [Active]      MEDICATIONS:  MEDICATIONS  (STANDING):  apixaban 2.5 milliGRAM(s) Oral every 12 hours  melatonin 3 milliGRAM(s) Oral at bedtime  metoprolol tartrate 25 milliGRAM(s) Oral every 8 hours  pantoprazole  Injectable 40 milliGRAM(s) IV Push every 12 hours  polyethylene glycol 3350 17 Gram(s) Oral daily  senna 2 Tablet(s) Oral at bedtime    MEDICATIONS  (PRN):  acetaminophen     Tablet .. 1000 milliGRAM(s) Oral every 6 hours PRN Mild Pain (1 - 3)  haloperidol    Injectable 0.5 milliGRAM(s) IntraMuscular once PRN Severe agitation secondary to psychosis, jacqueline, or poor impulse control  ondansetron Injectable 4 milliGRAM(s) IV Push every 6 hours PRN Nausea and/or Vomiting  oxyCODONE    IR 2.5 milliGRAM(s) Oral every 6 hours PRN Moderate Pain (4 - 6)  oxyCODONE    IR 5 milliGRAM(s) Oral every 6 hours PRN Severe Pain (7 - 10)      Allergies    No Known Allergies    Intolerances        OBJECTIVE:  Vital Signs Last 24 Hrs  T(C): 36.8 (04 Jun 2025 08:47), Max: 37.3 (03 Jun 2025 13:49)  T(F): 98.2 (04 Jun 2025 08:47), Max: 99.1 (03 Jun 2025 13:49)  HR: 105 (04 Jun 2025 08:00) (105 - 126)  BP: 113/83 (04 Jun 2025 08:00) (99/58 - 141/71)  BP(mean): 94 (04 Jun 2025 08:00) (70 - 111)  RR: 18 (04 Jun 2025 08:00) (18 - 18)  SpO2: 100% (04 Jun 2025 08:00) (97% - 100%)    Parameters below as of 04 Jun 2025 08:00  Patient On (Oxygen Delivery Method): room air      I&O's Summary    03 Jun 2025 07:01  -  04 Jun 2025 07:00  --------------------------------------------------------  IN: 100 mL / OUT: 800 mL / NET: -700 mL    04 Jun 2025 07:01  -  04 Jun 2025 11:15  --------------------------------------------------------  IN: 50 mL / OUT: 200 mL / NET: -150 mL        PHYSICAL EXAM:  General: awake, alert, oriented to self, NAD, no labored breathing on RA  HEENT: AT/NC  Lungs: limited, no crackles, no wheezes  Heart: irregular, tachycardic  Abdomen: soft, no tenderness to palpation  Extremities: warm, left knee without tenderness, no edema, warm, contracted    LABS:                        9.1    11.42 )-----------( 278      ( 03 Jun 2025 06:25 )             28.7     06-03    141  |  112[H]  |  5[L]  ----------------------------<  74  3.9   |  19[L]  |  0.49[L]    Ca    7.4[L]      03 Jun 2025 06:25  Phos  2.6     06-03  Mg     1.8     06-03          CAPILLARY BLOOD GLUCOSE        Urinalysis Basic - ( 03 Jun 2025 06:25 )    Color: x / Appearance: x / SG: x / pH: x  Gluc: 74 mg/dL / Ketone: x  / Bili: x / Urobili: x   Blood: x / Protein: x / Nitrite: x   Leuk Esterase: x / RBC: x / WBC x   Sq Epi: x / Non Sq Epi: x / Bacteria: x        MICRODATA:      RADIOLOGY/OTHER STUDIES:

## 2025-06-04 NOTE — PROGRESS NOTE ADULT - ASSESSMENT
79 y/o F with above PMH presents with SBO    SBO secondary femoral hernia  S/p hernia repair with mesh  Management per primary team, pain control per primary team. Can add Lidocaine patch to left knee.  Per GOC, family not interested in invasive procedures     Anemia normocytic, acute on chronic   Patient with progressive drop in Hb since April around 10  No reported bleeding, has been on AC for Afib, possible underlying colon malignancy. Family not pursuing further intervention.   AC resumed, Hb remains stable   Continue to monitor Hb, target Hb 7-8  Will get IV iron 200 mg for 3 days    Chronic Afib with RVR  Patient with rapid Afib s/p Dig load, on Apixaban   Management per Cardiology    Leucocytosis  Patient with uptrending WBC, no clear signs of infections  Continue to monitor    Dementia  CVA with residual deficit  Bedbound  Care per nursing protocol    DVT ppx: AC  Discussed with primary team

## 2025-06-04 NOTE — DISCHARGE NOTE NURSING/CASE MANAGEMENT/SOCIAL WORK - PATIENT PORTAL LINK FT
You can access the FollowMyHealth Patient Portal offered by [facility] by registering at the following website: [email or web address]. By joining [facility]’s FollowMyHealth portal, you will also be able to view your health information using other applications (apps) compatible with our system.

## 2025-06-04 NOTE — PROVIDER CONTACT NOTE (OTHER) - ASSESSMENT
tele tech called stating pt had 3 beats of non-sustaining Vtach at 12:46 PM, pt asymptomatic and is sleeping at this time

## 2025-06-04 NOTE — DISCHARGE NOTE NURSING/CASE MANAGEMENT/SOCIAL WORK - NSPROMEDSBROUGHTTOHOSP_GEN_A_NUR
10/17/22- TY SPOKE TO FACILITY ON 10/12/22  F/U APT WAS R/S AND CONFIRMED FOR 10/27/22 AND CTH IS SCHEDULED FOR 10/18/22  FACILITY AWARE    10/11/22- CALLED [facility] [phone] AND SPOKE TO MILENA  CONFIRMED 10/17/22 F/U AND CTH NEEDED PRIOR  PER MILENA, FACILITY ASKED TO PUSH OUT APPT DUE TO TRANSPORTATION REASONS (PT ONLY TAKES BUS) MILENA DID NOT KNOW HOW LONG UNTIL PT D/C, THEY HAVE A MEETING TOMORROW TO DISCUSS   ADVISED I'D F/U WITH FACILITY TOMORROW FOR POSSIBLE D/C DATE no

## 2025-06-05 VITALS — TEMPERATURE: 98 F

## 2025-06-05 PROCEDURE — 83735 ASSAY OF MAGNESIUM: CPT

## 2025-06-05 PROCEDURE — 99497 ADVNCD CARE PLAN 30 MIN: CPT | Mod: 25

## 2025-06-05 PROCEDURE — 80076 HEPATIC FUNCTION PANEL: CPT

## 2025-06-05 PROCEDURE — 83540 ASSAY OF IRON: CPT

## 2025-06-05 PROCEDURE — C1781: CPT

## 2025-06-05 PROCEDURE — 83690 ASSAY OF LIPASE: CPT

## 2025-06-05 PROCEDURE — 86923 COMPATIBILITY TEST ELECTRIC: CPT

## 2025-06-05 PROCEDURE — 80162 ASSAY OF DIGOXIN TOTAL: CPT

## 2025-06-05 PROCEDURE — 36415 COLL VENOUS BLD VENIPUNCTURE: CPT

## 2025-06-05 PROCEDURE — P9016: CPT

## 2025-06-05 PROCEDURE — 86901 BLOOD TYPING SEROLOGIC RH(D): CPT

## 2025-06-05 PROCEDURE — 82378 CARCINOEMBRYONIC ANTIGEN: CPT

## 2025-06-05 PROCEDURE — 93005 ELECTROCARDIOGRAM TRACING: CPT

## 2025-06-05 PROCEDURE — 86900 BLOOD TYPING SEROLOGIC ABO: CPT

## 2025-06-05 PROCEDURE — 74177 CT ABD & PELVIS W/CONTRAST: CPT

## 2025-06-05 PROCEDURE — 85025 COMPLETE CBC W/AUTO DIFF WBC: CPT

## 2025-06-05 PROCEDURE — 82962 GLUCOSE BLOOD TEST: CPT

## 2025-06-05 PROCEDURE — 36430 TRANSFUSION BLD/BLD COMPNT: CPT

## 2025-06-05 PROCEDURE — 86850 RBC ANTIBODY SCREEN: CPT

## 2025-06-05 PROCEDURE — 96375 TX/PRO/DX INJ NEW DRUG ADDON: CPT

## 2025-06-05 PROCEDURE — 99232 SBSQ HOSP IP/OBS MODERATE 35: CPT

## 2025-06-05 PROCEDURE — 85027 COMPLETE CBC AUTOMATED: CPT

## 2025-06-05 PROCEDURE — 71045 X-RAY EXAM CHEST 1 VIEW: CPT

## 2025-06-05 PROCEDURE — 87637 SARSCOV2&INF A&B&RSV AMP PRB: CPT

## 2025-06-05 PROCEDURE — 85610 PROTHROMBIN TIME: CPT

## 2025-06-05 PROCEDURE — 83605 ASSAY OF LACTIC ACID: CPT

## 2025-06-05 PROCEDURE — 86301 IMMUNOASSAY TUMOR CA 19-9: CPT

## 2025-06-05 PROCEDURE — 83550 IRON BINDING TEST: CPT

## 2025-06-05 PROCEDURE — 85730 THROMBOPLASTIN TIME PARTIAL: CPT

## 2025-06-05 PROCEDURE — 87086 URINE CULTURE/COLONY COUNT: CPT

## 2025-06-05 PROCEDURE — 96368 THER/DIAG CONCURRENT INF: CPT

## 2025-06-05 PROCEDURE — 93306 TTE W/DOPPLER COMPLETE: CPT

## 2025-06-05 PROCEDURE — 81001 URINALYSIS AUTO W/SCOPE: CPT

## 2025-06-05 PROCEDURE — 84100 ASSAY OF PHOSPHORUS: CPT

## 2025-06-05 PROCEDURE — 99233 SBSQ HOSP IP/OBS HIGH 50: CPT

## 2025-06-05 PROCEDURE — 99291 CRITICAL CARE FIRST HOUR: CPT

## 2025-06-05 PROCEDURE — 87040 BLOOD CULTURE FOR BACTERIA: CPT

## 2025-06-05 PROCEDURE — C9399: CPT

## 2025-06-05 PROCEDURE — 80048 BASIC METABOLIC PNL TOTAL CA: CPT

## 2025-06-05 PROCEDURE — 96365 THER/PROPH/DIAG IV INF INIT: CPT

## 2025-06-05 RX ORDER — CARVEDILOL 3.12 MG/1
1 TABLET, FILM COATED ORAL
Refills: 0 | DISCHARGE

## 2025-06-05 RX ORDER — METOPROLOL SUCCINATE 50 MG/1
1 TABLET, EXTENDED RELEASE ORAL
Qty: 0 | Refills: 0 | DISCHARGE

## 2025-06-05 RX ORDER — AMLODIPINE BESYLATE 10 MG/1
1 TABLET ORAL
Refills: 0 | DISCHARGE

## 2025-06-05 RX ORDER — APIXABAN 2.5 MG/1
1 TABLET, FILM COATED ORAL
Refills: 0 | DISCHARGE

## 2025-06-05 RX ORDER — APIXABAN 2.5 MG/1
0 TABLET, FILM COATED ORAL
Qty: 0 | Refills: 0 | DISCHARGE
Start: 2025-06-05

## 2025-06-05 RX ADMIN — APIXABAN 2.5 MILLIGRAM(S): 2.5 TABLET, FILM COATED ORAL at 06:01

## 2025-06-05 RX ADMIN — METOPROLOL SUCCINATE 25 MILLIGRAM(S): 50 TABLET, EXTENDED RELEASE ORAL at 06:01

## 2025-06-05 RX ADMIN — Medication 40 MILLIGRAM(S): at 11:00

## 2025-06-05 RX ADMIN — APIXABAN 2.5 MILLIGRAM(S): 2.5 TABLET, FILM COATED ORAL at 17:21

## 2025-06-05 NOTE — PROGRESS NOTE ADULT - NUTRITIONAL ASSESSMENT
This patient has been assessed with a concern for Malnutrition and has been determined to have a diagnosis/diagnoses of Severe protein-calorie malnutrition and Underweight (BMI < 19).    This patient is being managed with:   Diet NPO-  With Ice Chips/Sips of Water  Entered: May 30 2025  8:03AM  
This patient has been assessed with a concern for Malnutrition and has been determined to have a diagnosis/diagnoses of Severe protein-calorie malnutrition and Underweight (BMI < 19).    This patient is being managed with:   Diet Regular-  Entered: Jun 1 2025  9:37AM  
This patient has been assessed with a concern for Malnutrition and has been determined to have a diagnosis/diagnoses of Severe protein-calorie malnutrition and Underweight (BMI < 19).    This patient is being managed with:   Diet Regular-  Pureed (PUREED)  Entered: Jun 1 2025 10:33AM  
This patient has been assessed with a concern for Malnutrition and has been determined to have a diagnosis/diagnoses of Severe protein-calorie malnutrition and Underweight (BMI < 19).    This patient is being managed with:   Diet NPO-  With Ice Chips/Sips of Water  Entered: May 30 2025  8:03AM  
This patient has been assessed with a concern for Malnutrition and has been determined to have a diagnosis/diagnoses of Severe protein-calorie malnutrition and Underweight (BMI < 19).    This patient is being managed with:   Diet NPO-  With Ice Chips/Sips of Water  Entered: May 30 2025  8:03AM  
This patient has been assessed with a concern for Malnutrition and has been determined to have a diagnosis/diagnoses of Severe protein-calorie malnutrition and Underweight (BMI < 19).    This patient is being managed with:   Diet Regular-  Pureed (PUREED)  Entered: Jun 1 2025 10:33AM  

## 2025-06-05 NOTE — PROGRESS NOTE ADULT - PROBLEM SELECTOR PLAN 2
-CVA with residual   -FAST 7A  -High risk for delirium and related complications.  -Delirium precautions: bedside window with blinds open; frequent re-orientation; pictures of family if possible; minimize lines, physical restraints, nighttime awakenings as medically feasible; ensure bowel movements daily or every other day, monitor for urinary retention; avoid anticholinergic medications or benzodiazepines as clinically feasible.
-CVA with residual   -FAST 7A  -High risk for delirium and related complications.  -Delirium precautions: bedside window with blinds open; frequent re-orientation; pictures of family if possible; minimize lines, physical restraints, nighttime awakenings as medically feasible; ensure bowel movements daily or every other day, monitor for urinary retention; avoid anticholinergic medications or benzodiazepines as clinically feasible.

## 2025-06-05 NOTE — PROGRESS NOTE ADULT - PROBLEM SELECTOR PLAN 6
Complex decision making and advanced care planning in the setting of advanced age/advanced illness.
-Complex decision making and advanced care planning in the setting of advanced age/advanced illness.  -Emotional support provided, questions answered. Palliative medicine will sign off.  Please contact Palliative Medicine 24/7 at [phone] if the patient's symptoms and/or goals of care change, need to be readdressed, or if patient is readmitted in the future.

## 2025-06-05 NOTE — PROGRESS NOTE ADULT - PROBLEM SELECTOR PLAN 1
-CT showed concern for rectosigmoid wall thickening concerning for malignancy with possible metastatic lesions in the liver and lung.   -GI consulted for GI malignancy work up.   -Invasive work up/diagnostics are NOT within GOC.
-CT showed concern for rectosigmoid wall thickening concerning for malignancy with possible metastatic lesions in the liver and lung.   -GI consulted for GI malignancy work up.   -Invasive work up/diagnostics are NOT within GOC.

## 2025-06-05 NOTE — PROGRESS NOTE ADULT - SUBJECTIVE AND OBJECTIVE BOX
SUBJECTIVE:  Patient was seen and examined at bedside. Patient seen with aid at the bedside. Reports feeling ok, denies chest pain, no SOB, denies pain, Rate better controlled. Telemetry reviewed. Remains with poor po intake. No other complaints or events reported.    Review of systems: limited, negative   Diet, Regular:   Pureed (PUREED) (06-01-25 @ 10:33) [Active]      MEDICATIONS:  MEDICATIONS  (STANDING):  apixaban 2.5 milliGRAM(s) Oral every 12 hours  melatonin 3 milliGRAM(s) Oral at bedtime  metoprolol tartrate 25 milliGRAM(s) Oral every 8 hours  pantoprazole  Injectable 40 milliGRAM(s) IV Push every 12 hours  polyethylene glycol 3350 17 Gram(s) Oral daily  senna 2 Tablet(s) Oral at bedtime    MEDICATIONS  (PRN):  acetaminophen     Tablet .. 1000 milliGRAM(s) Oral every 6 hours PRN Mild Pain (1 - 3)  haloperidol    Injectable 0.5 milliGRAM(s) IntraMuscular once PRN Severe agitation secondary to psychosis, jacqueline, or poor impulse control  ondansetron Injectable 4 milliGRAM(s) IV Push every 6 hours PRN Nausea and/or Vomiting      Allergies    No Known Allergies    Intolerances        OBJECTIVE:  Vital Signs Last 24 Hrs  T(C): 36.8 (05 Jun 2025 08:53), Max: 36.8 (04 Jun 2025 14:08)  T(F): 98.3 (05 Jun 2025 08:53), Max: 98.3 (04 Jun 2025 14:08)  HR: 98 (05 Jun 2025 12:00) (86 - 98)  BP: 123/59 (05 Jun 2025 12:00) (100/55 - 127/67)  BP(mean): 83 (05 Jun 2025 12:00) (72 - 90)  RR: 16 (05 Jun 2025 12:00) (16 - 18)  SpO2: 95% (05 Jun 2025 12:00) (95% - 100%)    Parameters below as of 05 Jun 2025 12:00  Patient On (Oxygen Delivery Method): room air      I&O's Summary    04 Jun 2025 07:01  -  05 Jun 2025 07:00  --------------------------------------------------------  IN: 125 mL / OUT: 500 mL / NET: -375 mL    05 Jun 2025 07:01  -  05 Jun 2025 13:05  --------------------------------------------------------  IN: 75 mL / OUT: 0 mL / NET: 75 mL        PHYSICAL EXAM:  General:  JHEEN    LABS:              CAPILLARY BLOOD GLUCOSE            MICRODATA:      RADIOLOGY/OTHER STUDIES:   SUBJECTIVE:  Patient was seen and examined at bedside. Patient seen with aid at the bedside. Reports feeling ok, denies chest pain, no SOB, denies pain, Rate better controlled. Telemetry reviewed. Remains with poor po intake. No other complaints or events reported.    Review of systems: limited, negative   Diet, Regular:   Pureed (PUREED) (06-01-25 @ 10:33) [Active]      MEDICATIONS:  MEDICATIONS  (STANDING):  apixaban 2.5 milliGRAM(s) Oral every 12 hours  melatonin 3 milliGRAM(s) Oral at bedtime  metoprolol tartrate 25 milliGRAM(s) Oral every 8 hours  pantoprazole  Injectable 40 milliGRAM(s) IV Push every 12 hours  polyethylene glycol 3350 17 Gram(s) Oral daily  senna 2 Tablet(s) Oral at bedtime    MEDICATIONS  (PRN):  acetaminophen     Tablet .. 1000 milliGRAM(s) Oral every 6 hours PRN Mild Pain (1 - 3)  haloperidol    Injectable 0.5 milliGRAM(s) IntraMuscular once PRN Severe agitation secondary to psychosis, jacqueline, or poor impulse control  ondansetron Injectable 4 milliGRAM(s) IV Push every 6 hours PRN Nausea and/or Vomiting      Allergies    No Known Allergies    Intolerances        OBJECTIVE:  Vital Signs Last 24 Hrs  T(C): 36.8 (05 Jun 2025 08:53), Max: 36.8 (04 Jun 2025 14:08)  T(F): 98.3 (05 Jun 2025 08:53), Max: 98.3 (04 Jun 2025 14:08)  HR: 98 (05 Jun 2025 12:00) (86 - 98)  BP: 123/59 (05 Jun 2025 12:00) (100/55 - 127/67)  BP(mean): 83 (05 Jun 2025 12:00) (72 - 90)  RR: 16 (05 Jun 2025 12:00) (16 - 18)  SpO2: 95% (05 Jun 2025 12:00) (95% - 100%)    Parameters below as of 05 Jun 2025 12:00  Patient On (Oxygen Delivery Method): room air      I&O's Summary    04 Jun 2025 07:01  -  05 Jun 2025 07:00  --------------------------------------------------------  IN: 125 mL / OUT: 500 mL / NET: -375 mL    05 Jun 2025 07:01  -  05 Jun 2025 13:05  --------------------------------------------------------  IN: 75 mL / OUT: 0 mL / NET: 75 mL        PHYSICAL EXAM:  General: awake, alert, looking comfortable, no labored breathing on RA  HEENT: AT/NC  Lungs: limited, no crackles, no wheeze  Heart: irregular, + murmur   Abdomen: soft  Extremities: warm, no edema, contracted      LABS:              CAPILLARY BLOOD GLUCOSE            MICRODATA:      RADIOLOGY/OTHER STUDIES:

## 2025-06-05 NOTE — PROGRESS NOTE ADULT - SUBJECTIVE AND OBJECTIVE BOX
SUBJECTIVE: Pt seen and examined at bedside this am. Denies abdominal pain, and denies eating much as she does not like the food here.       MEDICATIONS  (STANDING):  apixaban 2.5 milliGRAM(s) Oral every 12 hours  melatonin 3 milliGRAM(s) Oral at bedtime  metoprolol tartrate 25 milliGRAM(s) Oral every 8 hours  pantoprazole  Injectable 40 milliGRAM(s) IV Push every 12 hours  polyethylene glycol 3350 17 Gram(s) Oral daily  senna 2 Tablet(s) Oral at bedtime    MEDICATIONS  (PRN):  acetaminophen     Tablet .. 1000 milliGRAM(s) Oral every 6 hours PRN Mild Pain (1 - 3)  haloperidol    Injectable 0.5 milliGRAM(s) IntraMuscular once PRN Severe agitation secondary to psychosis, jacqueline, or poor impulse control  ondansetron Injectable 4 milliGRAM(s) IV Push every 6 hours PRN Nausea and/or Vomiting      Vital Signs Last 24 Hrs  T(C): 36.2 (05 Jun 2025 04:28), Max: 36.8 (04 Jun 2025 08:47)  T(F): 97.1 (05 Jun 2025 04:28), Max: 98.3 (04 Jun 2025 14:08)  HR: 96 (05 Jun 2025 04:29) (86 - 105)  BP: 100/55 (05 Jun 2025 04:29) (100/55 - 117/65)  BP(mean): 76 (05 Jun 2025 04:29) (72 - 94)  RR: 16 (05 Jun 2025 04:29) (16 - 18)  SpO2: 100% (05 Jun 2025 04:29) (98% - 100%)    Parameters below as of 05 Jun 2025 04:29  Patient On (Oxygen Delivery Method): room air        PHYSICAL EXAM:  General: NAD, pt resting comfortably in bed  Pulm: No respiratory distress, nonlabored breathing, on room air  CVS: NSR, HDS  Abd: Soft, NT, ND  Extremities: WWP, no edema  Pulses:                  I&O's Detail    03 Jun 2025 07:01  -  04 Jun 2025 07:00  --------------------------------------------------------  IN:    IV PiggyBack: 100 mL  Total IN: 100 mL    OUT:    Voided (mL): 800 mL  Total OUT: 800 mL    Total NET: -700 mL      04 Jun 2025 07:01  -  05 Jun 2025 06:59  --------------------------------------------------------  IN:    Oral Fluid: 125 mL  Total IN: 125 mL    OUT:    Voided (mL): 300 mL  Total OUT: 300 mL    Total NET: -175 mL          LABS:                RADIOLOGY & ADDITIONAL STUDIES:

## 2025-06-05 NOTE — PROGRESS NOTE ADULT - CONVERSATION DETAILS
Provided updates and information sharing to support informed decision making. reviewed clinical course. Explored into more depth GOC given concern for malignancy, We discussed philosophy and benefits of hospice. Explained that hospice focuses on providing comfort and managing distressing symptoms, rather than pursuing aggressive measures; the goal is to minimize suffering in all forms and enhance patient's quality of life. Enrollment in hospice would help to avoid frequent hospital visits and admissions, which can be disruptive and uncomfortable for the patient. Introduced the role of hospice services and delineated the benefits provided. Differentiated inpatient vs home hospice and explained the intended philosophy of care. Omer expressed appreciation of the information provided. At this time, Omer would like time to discuss planning for EOL with the patient before proceeding with hospice referral. Omer will consider transitioning to comfort focused care as the patient moves closer to EOL. All questions answered. Emotional support provided.

## 2025-06-05 NOTE — PROGRESS NOTE ADULT - ASSESSMENT
79 y/o F with above PMH presents with SBO    SBO secondary femoral hernia  S/p hernia repair with mesh  Management per primary team, pain control per primary team. Can add Lidocaine patch to left knee.  Per GOC, family not interested in invasive procedures     Anemia normocytic, acute on chronic   Patient with progressive drop in Hb since April around 10  No reported bleeding, has been on AC for Afib, possible underlying colon malignancy. Family not pursuing further intervention.   AC resumed, Hb remains stable   Continue to monitor Hb, target Hb 7-8  S/p IV iron     Chronic Afib with RVR  Patient with rapid Afib s/p Dig, on Apixaban and Metoprolol rate better controlled   Management per Cardiology    Leucocytosis  Patient with uptrending WBC, no clear signs of infections  Continue to monitor    Dementia  CVA with residual deficit  Bedbound  Care per nursing protocol    DVT ppx: AC  Discussed with primary team

## 2025-06-05 NOTE — PROGRESS NOTE ADULT - NS ATTEST RISK PROBLEM GEN_ALL_CORE FT
Chronic Illness With Severe Exacerbation/Progression
Acute Illness That Poses A Threat To Life  Abrupt Change In Neurological Status  Chronic Illness With Severe Exacerbation/Progression

## 2025-06-05 NOTE — PROGRESS NOTE ADULT - PROBLEM SELECTOR PLAN 4
-Current Palliative Performance Scale/Karnofsky Score: 20-30   %  -Preadmit Karnofsky:  30-40 %     -Supportive care.
-Current Palliative Performance Scale/Karnofsky Score: 20-30   %  -Preadmit Karnofsky:  30-40 %     -Supportive care.

## 2025-06-05 NOTE — PROGRESS NOTE ADULT - PROVIDER SPECIALTY LIST ADULT
Cardiology
Cardiology
Internal Medicine
Surgery
Surgery
Cardiology
Internal Medicine
Palliative Care
Surgery
Internal Medicine
Surgery
Internal Medicine
Palliative Care

## 2025-06-05 NOTE — PROGRESS NOTE ADULT - SUBJECTIVE AND OBJECTIVE BOX
[facility] Geriatrics and Palliative Care  Juliette Alvarado, Geriatrics & Palliative Care NP  Contact Info: Call [phone] (HEAL Line) or message on [employer] Teams    SUBJECTIVE/OBJECTIVE:  Interval events reviewed. Patient seen and examined at bedside. Patient appears comfortable. Only complaint is lack of appetite for hospital food.     ALLERGIES: No Known Allergies    MEDICATIONS: REVIEWED  MEDICATIONS  (STANDING):  apixaban 2.5 milliGRAM(s) Oral every 12 hours  melatonin 3 milliGRAM(s) Oral at bedtime  metoprolol tartrate 25 milliGRAM(s) Oral every 8 hours  pantoprazole  Injectable 40 milliGRAM(s) IV Push every 12 hours  polyethylene glycol 3350 17 Gram(s) Oral daily  senna 2 Tablet(s) Oral at bedtime    MEDICATIONS  (PRN):  acetaminophen     Tablet .. 1000 milliGRAM(s) Oral every 6 hours PRN Mild Pain (1 - 3)  haloperidol    Injectable 0.5 milliGRAM(s) IntraMuscular once PRN Severe agitation secondary to psychosis, jacqueline, or poor impulse control  ondansetron Injectable 4 milliGRAM(s) IV Push every 6 hours PRN Nausea and/or Vomiting    Analgesic Use (Scheduled and PRNs) for past 24 hours (6a-6a):    melatonin   3 milliGRAM(s) Oral (06-04-25 @ 22:13)    PRESENT SYMPTOMS:   [ ] Patient unable to self report   Source if other than patient:  [ ]Family   [ ]Team     Pain [  ] denies  Location :        Quality:  Radiation:  Timing:  Aggravating factors:  Minimal acceptable level (0-10 scale):   Severity in last 24h (0-10 scale) :  Current score (0-10 scale):  Improves with:     PAIN AD Score:   [email or web address] (press ctrl +  left click to view)    If [  ], pt denies symptom.   Dyspnea:         [  ]  Anxiety:           [  ]  Difficulty sleeping: [  ]  Fatigue:           [  ]  Nausea:           [  ]  Loss of appetite:     [  ]  Dysphagia: [  ]  Constipation:   [  ]        LBM   Other Symptoms:    All other review of systems negative [ x ]    ECOG Performance:       Current Palliative Performance Scale/Karnofsky Score: 20-30   %  Preadmit Karnofsky:  30-40 %          PEx:  General: alert  oriented x 2 (self, hospital), verbal  Behavioral: calm  HEENT: atraumatic,  No dry mouth  RESP: Reg rhythm, No  tachypnea/labored breathing,  No audible excessive secretions  CV: RRR, S1S2,  No  tachycardia  GI: soft non distended non tender   :  incontinent    MUSK: weakness x4,  edema, bed bound  SKIN:  Poor skin turgor, Pallor, Pressure ulcer stage: II, L malleolus  NEURO: cognitive impairment  Oral intake ability:  minimal-mod capability    T(C): 36.7 (06-05-25 @ 13:37), Max: 36.8 (06-04-25 @ 14:08)  HR: 98 (06-05-25 @ 12:00) (86 - 98)  BP: 123/59 (06-05-25 @ 12:00) (100/55 - 127/67)  RR: 16 (06-05-25 @ 12:00) (16 - 18)  SpO2: 95% (06-05-25 @ 12:00) (95% - 100%)  Wt(kg): --    LABS: REVIEWED  CBC:    CMP:            RADIOLOGY & ADDITIONAL STUDIES:     DISCUSSION OF CASE:    CARE COORDINATION:

## 2025-06-05 NOTE — PROGRESS NOTE ADULT - PROBLEM SELECTOR PLAN 3
-BMI 18.3  -Hypoalbuminemia. Albumin 2.5  -Pressure injury  -Nutrition consult. Recs appreciated.
-BMI 18.3  -Hypoalbuminemia. Albumin 2.5  -Pressure injury  -Nutrition consult. Recs appreciated.

## 2025-06-05 NOTE — PROGRESS NOTE ADULT - PROBLEM SELECTOR PLAN 5
Advanced care planning/counseling discussion.   ·  Recommendation: -MOLST. DNR. DNI.  -Surrogate: Omer Lopez  -See [facility] note.   -Palliative will continue to follow.
Advanced care planning/counseling discussion.   ·  Recommendation: -MOLST. DNR. DNI.  -Surrogate: Omer Lopez  -See [facility] notes.

## 2025-06-11 DIAGNOSIS — E78.5 HYPERLIPIDEMIA, UNSPECIFIED: ICD-10-CM

## 2025-06-11 DIAGNOSIS — Z74.01 BED CONFINEMENT STATUS: ICD-10-CM

## 2025-06-11 DIAGNOSIS — Z79.01 LONG TERM (CURRENT) USE OF ANTICOAGULANTS: ICD-10-CM

## 2025-06-11 DIAGNOSIS — N39.0 URINARY TRACT INFECTION, SITE NOT SPECIFIED: ICD-10-CM

## 2025-06-11 DIAGNOSIS — D64.9 ANEMIA, UNSPECIFIED: ICD-10-CM

## 2025-06-11 DIAGNOSIS — R11.2 NAUSEA WITH VOMITING, UNSPECIFIED: ICD-10-CM

## 2025-06-11 DIAGNOSIS — I73.9 PERIPHERAL VASCULAR DISEASE, UNSPECIFIED: ICD-10-CM

## 2025-06-11 DIAGNOSIS — I48.20 CHRONIC ATRIAL FIBRILLATION, UNSPECIFIED: ICD-10-CM

## 2025-06-11 DIAGNOSIS — I69.954 HEMIPLEGIA AND HEMIPARESIS FOLLOWING UNSPECIFIED CEREBROVASCULAR DISEASE AFFECTING LEFT NON-DOMINANT SIDE: ICD-10-CM

## 2025-06-11 DIAGNOSIS — C78.7 SECONDARY MALIGNANT NEOPLASM OF LIVER AND INTRAHEPATIC BILE DUCT: ICD-10-CM

## 2025-06-11 DIAGNOSIS — I10 ESSENTIAL (PRIMARY) HYPERTENSION: ICD-10-CM

## 2025-06-11 DIAGNOSIS — K41.30 UNILATERAL FEMORAL HERNIA, WITH OBSTRUCTION, WITHOUT GANGRENE, NOT SPECIFIED AS RECURRENT: ICD-10-CM

## 2025-06-11 DIAGNOSIS — R53.81 OTHER MALAISE: ICD-10-CM

## 2025-06-11 DIAGNOSIS — C78.00 SECONDARY MALIGNANT NEOPLASM OF UNSPECIFIED LUNG: ICD-10-CM

## 2025-06-11 DIAGNOSIS — C19 MALIGNANT NEOPLASM OF RECTOSIGMOID JUNCTION: ICD-10-CM

## 2025-06-11 DIAGNOSIS — E43 UNSPECIFIED SEVERE PROTEIN-CALORIE MALNUTRITION: ICD-10-CM

## (undated) DEVICE — PACK GENERAL LAPAROSCOPY

## (undated) DEVICE — POSITIONER FOAM EGG CRATE ULNAR 2PCS (PINK)

## (undated) DEVICE — DRAPE TOWEL BLUE 17" X 24"

## (undated) DEVICE — STAPLER COVIDIEN ENDO GIA STANDARD HANDLE

## (undated) DEVICE — MARKING PEN W RULER

## (undated) DEVICE — SUT MONOCRYL 4-0 18" PS-2

## (undated) DEVICE — TROCAR ETHICON ENDOPATH XCEL BLADELESS 12MM X 100MM STABILITY

## (undated) DEVICE — LIGASURE MARYLAND 5MM X 37CM

## (undated) DEVICE — GLV 8 PROTEXIS (WHITE)

## (undated) DEVICE — TUBING STRYKER PNEUMOSURE HI FLOW INSUFFLATOR

## (undated) DEVICE — SUT VICRYL 0 27" UR-6

## (undated) DEVICE — WARMING BLANKET LOWER ADULT

## (undated) DEVICE — TIP METZENBAUM SCISSOR MONOPOLAR ENDOCUT (ORANGE)

## (undated) DEVICE — Device

## (undated) DEVICE — VENODYNE/SCD SLEEVE CALF MEDIUM

## (undated) DEVICE — TROCAR ETHICON ENDOPATH XCEL BLADELESS 5MM X 100MM STABILITY

## (undated) DEVICE — GOWN ROYAL SILK XL